# Patient Record
Sex: FEMALE | Race: WHITE | NOT HISPANIC OR LATINO | Employment: FULL TIME | URBAN - METROPOLITAN AREA
[De-identification: names, ages, dates, MRNs, and addresses within clinical notes are randomized per-mention and may not be internally consistent; named-entity substitution may affect disease eponyms.]

---

## 2024-02-22 ENCOUNTER — PATIENT OUTREACH (OUTPATIENT)
Dept: ONCOLOGY | Facility: CLINIC | Age: 64
End: 2024-02-22

## 2024-02-22 ENCOUNTER — TRANSCRIBE ORDERS (OUTPATIENT)
Dept: OTHER | Age: 64
End: 2024-02-22

## 2024-02-22 DIAGNOSIS — C23 GALLBLADDER CANCER (H): Primary | ICD-10-CM

## 2024-02-23 NOTE — PROGRESS NOTES
New Patient Oncology Nurse Navigator Note     Referring provider: self-referral    Referring Clinic/Organization: [facility] / [facility]      Referred to: Medical Oncology  Surgical Oncology -  Hepatobiliary / GI Cancers    Requested provider (if applicable): First available - did not specify     Referral Received: 02/22/24       Evaluation for : gallbladder malignancy     Clinical History (per Nurse review of records provided):      (See my bookmarks for pertinent records in Epic)      * 2/2/2024 lap enrique at [facility]    FINAL DIAGNOSIS     A. Gallbladder, cholecystectomy:    Invasive poorly differentiated adenocarcinoma of gallbladder       Tumor measures 2.4 cm in greatest dimension       Cystic duct margin and liver parenchyma bed margin negative for carcinoma       Lymphovascular invasion present       High grade dysplasia and low grade dysplasia present    Chronic cholecystitis with cholelithiasis    See synoptic report below        Selected slides from this case were also reviewed by Dr. ARMANDO Soria who agrees with the diagnosis.      The results of this case were communicated to Dr. Woody Munoz via EPIC email system on 02/06/2024.        Block for potential further studies: block A3          * 2/13/2024 CT CAP ()    IMPRESSION:   1.  A 2.5 cm fluid collection the gallbladder fossa. While this most likely represents a postsurgical seroma, the differential diagnosis includes abscess and biloma.   2.  No evidence of metastatic disease in the chest, abdomen or pelvis.      * 2/21/2024 Pt met w/ Dr Harris ([facility] [place]) for additional surgery planning:    Assessment and Plan: This is a pleasant 63-year-old female who was incidentally found to have poorly differentiated gallbladder adenocarcinoma following laparoscopic cholecystectomy for symptomatic cholelithiasis. Discussed pathology results with her and recommendations for segment IVB/V hepatic resection with portal lymphadenectomy  given pT2a with lymphovascular invasion. Fortunately her cystic duct margin was negative and thus does not require additional resection or intraoperative frozen section.    * 3/7/2024 surgery planned at [facility] [place] _______      Clinical Assessment / Barriers to Care (Per Nurse):    I spoke to pt, she requests 2nd opinion at N from our surg onc and med onc teams. Dr Hamilton has availability Mon 2/26/24 & Dr Mayers 3/4/24. Will offer appts to pt, or schedule per pt preference.        Records Location: [facility] Everywhere     Records Needed:     Outside imaging, path    Additional testing needed prior to consult:     N/A          Too Engel, RN, BSN, OCN  Oncology New Patient Nurse Navigator   [facility] [place] [facility]  [phone]

## 2024-02-23 NOTE — TELEPHONE ENCOUNTER
Appt Info          Pre Visit Info February 23, 2024 3:28 PM    Referring Provider/Location:  2nd Opinion  Dx and Code: Gallbladder cancer (H) [C23]  Appt Date:  2.26.24  Appt Type:  [facility]  Provider: Silviano Hamilton  Records:  [facility] are in Care Everywhere  Bx:  2.2.24 -  Regions/HP's Case: [id number]         Imaging:  [facility]     Records Requested  TJ   Clinic name Comments/Action Taken   [facility] February 23, 2024 3:45 PM    Called and requested all abdominal images over last 5 years  February 26, 2024  7:19 AM    Images received and resolved to PACS.   Regions/HP's February 23, 2024 3:45 PM    Faxed request for pathology slides  Trk: [id number]

## 2024-02-26 ENCOUNTER — PRE VISIT (OUTPATIENT)
Dept: SURGERY | Facility: CLINIC | Age: 64
End: 2024-02-26
Payer: COMMERCIAL

## 2024-02-26 ENCOUNTER — ONCOLOGY VISIT (OUTPATIENT)
Dept: SURGERY | Facility: CLINIC | Age: 64
End: 2024-02-26
Attending: SURGERY
Payer: COMMERCIAL

## 2024-02-26 VITALS
BODY MASS INDEX: 24.93 KG/M2 | RESPIRATION RATE: 16 BRPM | SYSTOLIC BLOOD PRESSURE: 113 MMHG | WEIGHT: 140.7 LBS | DIASTOLIC BLOOD PRESSURE: 77 MMHG | OXYGEN SATURATION: 96 % | HEART RATE: 89 BPM | HEIGHT: 63 IN | TEMPERATURE: 98.2 F

## 2024-02-26 DIAGNOSIS — C23 GALLBLADDER CANCER (H): Primary | ICD-10-CM

## 2024-02-26 PROCEDURE — 99205 OFFICE O/P NEW HI 60 MIN: CPT | Performed by: SURGERY

## 2024-02-26 PROCEDURE — 99213 OFFICE O/P EST LOW 20 MIN: CPT | Performed by: SURGERY

## 2024-02-26 RX ORDER — ESCITALOPRAM OXALATE 10 MG/1
1 TABLET ORAL
COMMUNITY
Start: 2024-02-23 | End: 2024-08-12

## 2024-02-26 RX ORDER — TRAZODONE HYDROCHLORIDE 50 MG/1
50 TABLET, FILM COATED ORAL
COMMUNITY
Start: 2024-02-23 | End: 2024-08-12

## 2024-02-26 ASSESSMENT — PAIN SCALES - GENERAL: PAINLEVEL: NO PAIN (0)

## 2024-02-26 NOTE — TELEPHONE ENCOUNTER
Appt Info          Pre Visit Info February 26, 2024 7:48 AM    Referring Provider/Location:  2nd opinion  Dx and Code: Gallbladder cancer (H) [C23]   Appt Date:  3.4.24  Appt Type:  [facility]  Provider: Talib  See pre-visit for 2.26.24- Images are in PACS and path has been requested  Records:  [facility] are in Care Everywhere  Bx:  2.2.24 -  Regions/HP's Case: [id number]        Imaging:  [facility]     Action Taken  Date/Description  TJ   Pathology Slides  February 28, 2024  12:46 PM  TJ  Slides [id number] from [facility] received and sent to 5th floor path lab for review.

## 2024-02-26 NOTE — PROGRESS NOTES
Department of Surgery  Division of Surgical Oncology    New Patient Consultation  Feb 26, 2024    Deann Durand is a 63 year old female who presents with incidental gallbladder cancer.  She was self-referred.    History of Present Illness     Sparkle recently underwent a lap enrique for a long-standing history of vague abdominal pain. Preop workup was consistent with symptomatic cholelithiasis. She underwent an uneventful lap enrique (per op notes) with Dr. Munoz on 2/2/24.    Pathology showed a poorly differentiated adenocarcinoma of the gallbladder, 2.4cm, cystic duct and liver margin both negative.     She is here seeking a second opinion - she recently saw Dr. Harris on 2/26 and he recommended a 4b/5 resection and portal LND, which is currently scheduled for early in March.    She reports recovering well from her lap enrique and has no additional pain.      Past Medical History:   Diagnosis Date    Depression     Gastroesophageal reflux disease without esophagitis     Malignant neoplasm of gallbladder (H)     Sleep disorder        Past Surgical History:   Procedure Laterality Date    CARPAL TUNNEL RELEASE RT/LT      CERVICAL SPINE SURGERY      No hardware in place    HYSTERECTOMY      LAPAROSCOPIC CHOLECYSTECTOMY      TUBAL LIGATION         Current Outpatient Medications   Medication Sig Dispense Refill    escitalopram (LEXAPRO) 10 MG tablet Take 1 tablet by mouth daily at 2 pm      omeprazole (PRILOSEC) 20 MG DR capsule Take 20 mg by mouth      traZODone (DESYREL) 50 MG tablet Take 50 mg by mouth at bedtime      MULTIPLE VITAMINS-MINERALS PO           Allergies   Allergen Reactions    Morphine Nausea and Vomiting        Social History:   reports that she has been smoking cigarettes. She has a 45 pack-year smoking history. She does not have any smokeless tobacco history on file. She reports that she does not currently use alcohol.    Family History:  Family History   Problem Relation Age of Onset    Throat cancer  "Father     Cancer Sister     Lung Cancer Sister     Cancer Brother     Throat cancer Maternal Grandmother     Colon Cancer Maternal Uncle          Physical Exam     /77 (BP Location: Right arm, Patient Position: Sitting, Cuff Size: Adult Regular)   Pulse 89   Temp 98.2  F (36.8  C) (Oral)   Resp 16   Ht 1.594 m (5' 2.75\")   Wt 63.8 kg (140 lb 11.2 oz)   SpO2 96%   BMI 25.12 kg/m       General: NAD, alert, oriented  HEENT: no scleral icterus, EOMI  Pulm: non-labored breathing, equal excursion bilaterally  CV: regular rate and rhythm  Abdomen: soft, non-tender, non-distended, incisions CDI   Extremities: warm, no edema  Skin: no apparent rashes or lesions  Neuro: no significant functional deficit, able to ambulate to and from exam table without assistance      Outside Records:  Reviewed extensively, see HPI    Workup has included a CT CAP with no evidence of metastasis (images and report reviewed, dated 2/13/24).      Assessment & Plan     Deann Durand is a 63 year old female with incidental gallbladder cancer.    The natural history of gallbladder cancer was discussed with the patient.    Incidental gallbladder cancer - gallbladder cancer diagnosed after a cholecystectomy done for reasons other than cancer - is the most common way that gallbladder cancer is diagnosed. The incidence of this is <0.5% of all cholecystectomies.    National standards in the management of gallbladder cancer recommend re-excision of the hepatic parenchymal margin to ensure clear margins; this requires resection of some or all of segments 4B and 5. The cystic duct margin should be re-excised if there is concern for residual cancer or dysplasia. Finally, a portal lymphadenectomy should be performed for prognostic/staging information and a possible therapeutic benefit. All of this information is regularly obtained prior to consideration of additional chemotherapy or other treatments in collaboration with a Medical " Oncologist.    In this case, I believe she is a good candidate for the above surgery - which I usually do in addition to a same-time diagnostic laparoscopy (owing to high rates of missed peritoneal disease at the index operation, depending on the size/stage of the cancer). She already has this scheduled with Dr. Harris and has decided to move forward with that date.    She did ask about seeing a medical oncologist here and she is already scheduled to see Dr. Mayers. I told both the patient and Dr. Mayers that they should push out their visit until after her surgical pathology from the second procedure is available (presumably mid to late March).      All questions were answered to their apparent satisfaction, and contact information was provided should additional questions or concerns arise.    Plan Summary:  -Patient to undergo portal LND and 4b/5 resection with Tacho Harris  -Patient has appointment with Dr. Mayers (may be pushed back - message sent to his team)  -Follow-up with me EDIS Hamilton MD MPHS      Today's visit was centered around a new cancer diagnosis in the setting of additional medical comorbidities. The risk of additional morbidity or mortality with or without further treatment is high. Total time spent was 60 minutes, including but not limited to patient-facing time, chart review, review of tests/studies as noted above, and care coordination.

## 2024-02-26 NOTE — NURSING NOTE
"Oncology Rooming Note    February 26, 2024 8:34 AM   Deann Durand is a 63 year old female who presents for:    Chief Complaint   Patient presents with    Oncology Clinic Visit     Gallbladder cancer     Initial Vitals: /77 (BP Location: Right arm, Patient Position: Sitting, Cuff Size: Adult Regular)   Pulse 89   Temp 98.2  F (36.8  C) (Oral)   Resp 16   Ht 1.594 m (5' 2.75\")   Wt 63.8 kg (140 lb 11.2 oz)   SpO2 96%   BMI 25.12 kg/m   Estimated body mass index is 25.12 kg/m  as calculated from the following:    Height as of this encounter: 1.594 m (5' 2.75\").    Weight as of this encounter: 63.8 kg (140 lb 11.2 oz). Body surface area is 1.68 meters squared.  No Pain (0) Comment: Data Unavailable   No LMP recorded.  Allergies reviewed: Yes  Medications reviewed: Yes    Medications: Medication refills not needed today.  Pharmacy name entered into [employer]: [facility] - [address]    Frailty Screening:   Is the patient here for a new oncology consult visit in cancer care? 1. Yes. Over the past month, have you experienced difficulty or required a caregiver to assist with:   1. Balance, walking or general mobility (including any falls)? NO  2. Completion of self-care tasks such as bathing, dressing, toileting, grooming/hygiene?  NO  3. Concentration or memory that affects your daily life?  YES       Clinical concerns: none      Rosaura Vega, EMT  2/26/2024              "

## 2024-02-26 NOTE — LETTER
2/26/2024         RE: Deann Durand  [address]  [facility] [address]        Dear Colleague,    Thank you for referring your patient, Deann Durand, to the [facility]. Please see a copy of my visit note below.      Department of Surgery  Division of Surgical Oncology    New Patient Consultation  Feb 26, 2024    Deann Durand is a 63 year old female who presents with incidental gallbladder cancer.  She was self-referred.    History of Present Illness    Sparkle recently underwent a lap enrique for a long-standing history of vague abdominal pain. Preop workup was consistent with symptomatic cholelithiasis. She underwent an uneventful lap enrique (per op notes) with Dr. Munoz on 2/2/24.    Pathology showed a poorly differentiated adenocarcinoma of the gallbladder, 2.4cm, cystic duct and liver margin both negative.     She is here seeking a second opinion - she recently saw Dr. Harris on 2/26 and he recommended a 4b/5 resection and portal LND, which is currently scheduled for early in March.    She reports recovering well from her lap enrique and has no additional pain.      Past Medical History:   Diagnosis Date    Depression     Gastroesophageal reflux disease without esophagitis     Malignant neoplasm of gallbladder (H)     Sleep disorder        Past Surgical History:   Procedure Laterality Date    CARPAL TUNNEL RELEASE RT/LT      CERVICAL SPINE SURGERY      No hardware in place    HYSTERECTOMY      LAPAROSCOPIC CHOLECYSTECTOMY      TUBAL LIGATION         Current Outpatient Medications   Medication Sig Dispense Refill    escitalopram (LEXAPRO) 10 MG tablet Take 1 tablet by mouth daily at 2 pm      omeprazole (PRILOSEC) 20 MG DR capsule Take 20 mg by mouth      traZODone (DESYREL) 50 MG tablet Take 50 mg by mouth at bedtime      MULTIPLE VITAMINS-MINERALS PO           Allergies   Allergen Reactions    Morphine Nausea and Vomiting        Social History:   reports that she has been smoking  "cigarettes. She has a 45 pack-year smoking history. She does not have any smokeless tobacco history on file. She reports that she does not currently use alcohol.    Family History:  Family History   Problem Relation Age of Onset    Throat cancer Father     Cancer Sister     Lung Cancer Sister     Cancer Brother     Throat cancer Maternal Grandmother     Colon Cancer Maternal Uncle          Physical Exam    /77 (BP Location: Right arm, Patient Position: Sitting, Cuff Size: Adult Regular)   Pulse 89   Temp 98.2  F (36.8  C) (Oral)   Resp 16   Ht 1.594 m (5' 2.75\")   Wt 63.8 kg (140 lb 11.2 oz)   SpO2 96%   BMI 25.12 kg/m       General: NAD, alert, oriented  HEENT: no scleral icterus, EOMI  Pulm: non-labored breathing, equal excursion bilaterally  CV: regular rate and rhythm  Abdomen: soft, non-tender, non-distended, incisions CDI   Extremities: warm, no edema  Skin: no apparent rashes or lesions  Neuro: no significant functional deficit, able to ambulate to and from exam table without assistance      Outside Records:  Reviewed extensively, see HPI    Workup has included a CT CAP with no evidence of metastasis (images and report reviewed, dated 2/13/24).      Assessment & Plan    Deann Durand is a 63 year old female with incidental gallbladder cancer.    The natural history of gallbladder cancer was discussed with the patient.    Incidental gallbladder cancer - gallbladder cancer diagnosed after a cholecystectomy done for reasons other than cancer - is the most common way that gallbladder cancer is diagnosed. The incidence of this is <0.5% of all cholecystectomies.    National standards in the management of gallbladder cancer recommend re-excision of the hepatic parenchymal margin to ensure clear margins; this requires resection of some or all of segments 4B and 5. The cystic duct margin should be re-excised if there is concern for residual cancer or dysplasia. Finally, a portal lymphadenectomy should be " performed for prognostic/staging information and a possible therapeutic benefit. All of this information is regularly obtained prior to consideration of additional chemotherapy or other treatments in collaboration with a Medical Oncologist.    In this case, I believe she is a good candidate for the above surgery - which I usually do in addition to a same-time diagnostic laparoscopy (owing to high rates of missed peritoneal disease at the index operation, depending on the size/stage of the cancer). She already has this scheduled with Dr. Harris and has decided to move forward with that date.    She did ask about seeing a medical oncologist here and she is already scheduled to see Dr. Mayers. I told both the patient and Dr. Mayers that they should push out their visit until after her surgical pathology from the second procedure is available (presumably mid to late March).      All questions were answered to their apparent satisfaction, and contact information was provided should additional questions or concerns arise.    Plan Summary:  -Patient to undergo portal LND and 4b/5 resection with Tacho Harris  -Patient has appointment with Dr. Mayers (may be pushed back - message sent to his team)  -Follow-up with me EDIS Hamilton MD MPHS      Today's visit was centered around a new cancer diagnosis in the setting of additional medical comorbidities. The risk of additional morbidity or mortality with or without further treatment is high. Total time spent was 60 minutes, including but not limited to patient-facing time, chart review, review of tests/studies as noted above, and care coordination.

## 2024-02-29 ENCOUNTER — LAB REQUISITION (OUTPATIENT)
Dept: LAB | Facility: CLINIC | Age: 64
End: 2024-02-29
Payer: COMMERCIAL

## 2024-02-29 PROCEDURE — 88321 CONSLTJ&REPRT SLD PREP ELSWR: CPT | Performed by: PATHOLOGY

## 2024-03-04 ENCOUNTER — PRE VISIT (OUTPATIENT)
Dept: ONCOLOGY | Facility: CLINIC | Age: 64
End: 2024-03-04
Payer: COMMERCIAL

## 2024-03-15 LAB
PATH REPORT.COMMENTS IMP SPEC: ABNORMAL
PATH REPORT.COMMENTS IMP SPEC: ABNORMAL
PATH REPORT.COMMENTS IMP SPEC: YES
PATH REPORT.FINAL DX SPEC: ABNORMAL
PATH REPORT.GROSS SPEC: ABNORMAL
PATH REPORT.MICROSCOPIC SPEC OTHER STN: ABNORMAL
PATH REPORT.RELEVANT HX SPEC: ABNORMAL
PATH REPORT.RELEVANT HX SPEC: ABNORMAL
PATH REPORT.SITE OF ORIGIN SPEC: ABNORMAL

## 2024-04-01 ENCOUNTER — PATIENT OUTREACH (OUTPATIENT)
Dept: ONCOLOGY | Facility: CLINIC | Age: 64
End: 2024-04-01
Payer: COMMERCIAL

## 2024-04-01 ENCOUNTER — ONCOLOGY VISIT (OUTPATIENT)
Dept: ONCOLOGY | Facility: CLINIC | Age: 64
End: 2024-04-01
Attending: STUDENT IN AN ORGANIZED HEALTH CARE EDUCATION/TRAINING PROGRAM
Payer: COMMERCIAL

## 2024-04-01 VITALS
WEIGHT: 135 LBS | HEART RATE: 99 BPM | BODY MASS INDEX: 23.92 KG/M2 | DIASTOLIC BLOOD PRESSURE: 82 MMHG | OXYGEN SATURATION: 97 % | TEMPERATURE: 97.4 F | RESPIRATION RATE: 16 BRPM | SYSTOLIC BLOOD PRESSURE: 134 MMHG | HEIGHT: 63 IN

## 2024-04-01 DIAGNOSIS — C23 MALIGNANT NEOPLASM OF GALLBLADDER (H): Primary | ICD-10-CM

## 2024-04-01 DIAGNOSIS — Z80.0 FAMILY HISTORY OF COLON CANCER: ICD-10-CM

## 2024-04-01 PROCEDURE — 99417 PROLNG OP E/M EACH 15 MIN: CPT | Mod: GC | Performed by: STUDENT IN AN ORGANIZED HEALTH CARE EDUCATION/TRAINING PROGRAM

## 2024-04-01 PROCEDURE — 99205 OFFICE O/P NEW HI 60 MIN: CPT | Mod: GC | Performed by: STUDENT IN AN ORGANIZED HEALTH CARE EDUCATION/TRAINING PROGRAM

## 2024-04-01 PROCEDURE — 99213 OFFICE O/P EST LOW 20 MIN: CPT | Performed by: STUDENT IN AN ORGANIZED HEALTH CARE EDUCATION/TRAINING PROGRAM

## 2024-04-01 ASSESSMENT — PAIN SCALES - GENERAL: PAINLEVEL: NO PAIN (0)

## 2024-04-01 NOTE — NURSING NOTE
"Oncology Rooming Note    April 1, 2024 1:24 PM   Deann Durand is a 63 year old female who presents for:    Chief Complaint   Patient presents with    Oncology Clinic Visit     Malignant neoplasm of gallbladder     Initial Vitals: /82 (BP Location: Right arm, Patient Position: Sitting, Cuff Size: Adult Small)   Pulse 99   Temp 97.4  F (36.3  C) (Oral)   Resp 16   Ht 1.588 m (5' 2.5\")   Wt 61.2 kg (135 lb)   SpO2 97%   BMI 24.30 kg/m   Estimated body mass index is 24.3 kg/m  as calculated from the following:    Height as of this encounter: 1.588 m (5' 2.5\").    Weight as of this encounter: 61.2 kg (135 lb). Body surface area is 1.64 meters squared.  No Pain (0) Comment: Data Unavailable   No LMP recorded.  Allergies reviewed: Yes  Medications reviewed: Yes    Medications: Medication refills not needed today.  Pharmacy name entered into [employer]: [facility] [address] [place] [address]    Frailty Screening:   Is the patient here for a new oncology consult visit in cancer care? 1. Yes. Over the past month, have you experienced difficulty or required a caregiver to assist with:   1. Balance, walking or general mobility (including any falls)? NO  2. Completion of self-care tasks such as bathing, dressing, toileting, grooming/hygiene?  NO  3. Concentration or memory that affects your daily life?  NO       Clinical concerns: none       Carmita Downing              "

## 2024-04-01 NOTE — LETTER
4/1/2024         RE: Deann Durand  [address]  [facility] [address]        Dear Colleague,    Thank you for referring your patient, Deann Durand, to the [facility]. Please see a copy of my visit note below.    [facility]  Medical Oncology Initial Patient Visit Note    Patient name: Deann Durand  YOB: 1960  Visit date: 4/1//2024    Oncologic History:  Diagnosis: Localized, resected (R0) gallbladder cancer (adenocarcinoma), negative (R0) margins, involvement of perimuscular adipose tissue on peritoneal side, poorly differentiated with lymphovascular invasion (LVI) present, 4/4 portal LN involved  Stage of cancer: [id number]     Prior treatment(s):   - 1) laparoscopic cholecystectomy on 2/2/24  - 2) Laparoscopic re-exploration with resection of liver (segment 4b/5) and portal lymphadenectomy on 3/7/24    Current treatment(s):  Adjuvant systemic therapy with oral capecitabine x 6 months (planned)    Treatment intent:  Curative     Care Team  Medical oncologist: Albert Mayers MD ([facility])  Surgical oncologist:  Tacho Harris MD ([facility]) and Silviano Hamilton MD ([facility])  Other members of care team: Dr. Munoz (General Surgery at [facility]) , Domi Darden (PCP) at [facility]   Primary caregiver at home/ contact:  Libra Beard (daughter) at [phone]    Reason for consultation/ patient visit:  Consideration of adjuvant therapy for resected gallbladder cancer     History of presenting illness:  Ms. Deann Durand is a 63 year old female with recent diagnosis of gallbladder cancer (now resected x 2) who presents to establish medical oncology care. Her oncologic history (as obtained from chart review, patient interview) is summarized below  -    12/23 and 1/24 - presented with chronic, vague abdominal pain and dyspepsia. Diagnosed with symptomatic cholelithiasis.   -2/2/24 : laparoscopic cholecystectomy with Dr. Munoz ([facility]) at  [facility]. Pathology showed poorly differentiated adenocarcinoma of gallbladder, 2.4cm in size, negative margins in cystic duct and liver side. pT2a with perimuscular and lymphovascular invasion noted.   -2/13/24: CT CAP - no evidence of metastatic disease.   -2/21/24 : [place]. Tacho [place] [facility] recommended repeat surgery with partial hepatic resection and portal lymphadenectomy.   -2/26/24 : Saw surgeon Dr. Silviano Hamilton at [facility], concurred with above.   -3/07/24 : underwent laparoscopic liver resection (of segment 4b/5) and portal lympadenectomy  -3/25/24 : visit with Med oncology to discuss adjuvant therapy     Interval history:  She is here today with her daughter Libra. Overall, she states she has recovered reasonably well from surgery and had a post-op check-up with Dr. Harris last week which went well. She reports only mild persistent pain at surgical site which is slowly resolving. She reports good appetite and po intake. Normal Bowel movements. She is walking around her house normally, and no issues with any ADL's. She is currently on short-term disability and planning to return to work on 4/27/24.     Past medical history:  GERD  Bilateral carpal tunnel syndrome    Past surgical history:  Laparoscopic Cholecystectomy (2/2/24)  Laparoscopic Liver resection (seg 4b/5) and portal lymphadenectomy (3/7/24)  Bladder suspension   cervical disc arthroplasty   hysterectomy   wisdom teeth extraction    Social history:   Currently lives independently in [place], [place]. Works at [employer] in [place], worked there for last 5 years. Jeet Smyth lives in [place], [place] (30min away from her) and involved in her care, also has health issues of her own. She also has two neices in [place] who check in on her. Loves to garden. 2 daughters, 4 grandkids,  Former smoker, 45 pack year history, currently smoking 1pack per day.       Family history:  EtOh use disorder in her birth father, birth mother,  "and brother;   Cancer -  father -throat cacner, uncle had colon cancer, maternal grandmother - throat cancer, and sister - lung cancer, another sister - stomach and colon cancer;   Dementia in her sister.     Allergies:   Allergies   Allergen Reactions    Morphine Nausea and Vomiting       Outpatient medications:     Current Outpatient Medications:     escitalopram (LEXAPRO) 10 MG tablet, Take 1 tablet by mouth daily at 2 pm, Disp: , Rfl:     MULTIPLE VITAMINS-MINERALS PO, , Disp: , Rfl:     omeprazole (PRILOSEC) 20 MG DR capsule, Take 20 mg by mouth, Disp: , Rfl:     traZODone (DESYREL) 50 MG tablet, Take 50 mg by mouth at bedtime, Disp: , Rfl:     Physical examination:  Vital signs: /82 (BP Location: Right arm, Patient Position: Sitting, Cuff Size: Adult Small)   Pulse 99   Temp 97.4  F (36.3  C) (Oral)   Resp 16   Ht 1.588 m (5' 2.5\")   Wt 61.2 kg (135 lb)   SpO2 97%   BMI 24.30 kg/m      ECOG performance status:  1  Vascular access:  none    GENERAL: moderately nourished, sitting in chair, no acute distress.   HEENT: No icterus, no pallor.   LUNGS: Normal work of breathing.   CARDIOVASCULAR: Regular rate and rhythm  ABDOMEN: Soft, nontender  EXTREMITIES: No edema, decreased muscle mass   NEUROLOGIC: Alert and oriented; mild resting tremors in hands. No gait instability.       Lab data:  I have personally reviewed the following lab data which are notable for    CBC shows mild, normocytic anemia with Hgb 11.1, metabolic panel without evidence of renal dysfunction (creat low normal), liver panel showing hypoalbuminemia (Alb 3.1), mild elevation in ALP (128), AST (58) and ALT (92) and normal bilirubin (0.8, direct 0.2)      Radiology data:  I have personally reviewed the images of / or the following radiology data which are notable for      CT CAP (2/15/24)  IMPRESSION:   1. A 2.5 cm fluid collection the gallbladder fossa. While this most likely represents a postsurgical seroma, the differential " diagnosis includes abscess and biloma.   2.  No evidence of metastatic disease in the chest, abdomen or pelvis.    Pathology and other data:  I have personally reviewed and interpreted the following data notable for      Gall bladder cholecystectomy specimen:(in-house N path consult report)   CASE FROM [place], [place] ([id number], OBTAINED 02/02/24):  Gallbladder, cholecystectomy:  - Adenocarcinoma, biliary type (2.4 cm)  - Tumor invades the perimuscular adipose tissue on the peritoneal side, without involvement of the serosa (pT2a)  - Lymphatic vascular invasion present  - Surgical margins are negative for malignancy or dysplasia    Liver Resection (IVb and V) and portal lymphadenectomy (3/7/24) specimen - from [facility] via Care Everywhere:  Specimen number:  [id number]   Final Diagnosis:   A. Liver, segment 4a nodule, wedge biopsy - Fibrous tissue and benign bile ducts, negative for   malignancy.     B. Lymphadenectomy, abdomen, dissection - Four lymph nodes positive for metastatic carcinoma   (4/4) and one fragment of benign adipose tissue.     C. Cystic duct stump, abdomen, biopsy - Cystic duct with surrounding fibroadipose and nerve   tissue. Negative for dysplasia and malignancy.     Liver, 4b 5, partial resection - Liver with prior procedure-related change. Negative for   malignancy.      Assessment and Plan:  Ms. Deann Durand is a 63 year old female with prior history of chronic smoking who presented initially with dyspepsia and abdominal pain, underwent cholecystectomy for what was presumed to be symptomatic cholelithiasis and was diagnosed with gallbladder cancer after it was incidentally discovered on histopathological analysis of cholecystectomy specimen. She underwent repeat surgery with resection of liver bed(seg 4b and 5) and portal lymphadenectomy with evidence of clear margins but involvement of portal LN. She is here today to discuss adjuvant therapy for her resected gallbladder  cancer.     We discussed her clinical course, extent of cancer and staging (see above). We discussed she has received standard of care surgical resection with removal of gallbladder as well as adjacent liver segments and portal LN's. We reviewed the high risk of recurrence with GB cancer in general and in particular in her case with risk factors such as high T-stage (tumor invading perimuscular connective tissue), lymphovascular invasion and involvement of >3 portal LN's.     We discussed the benefits and risks of adjuvant therapy with oral capecitabine for a total duration of 6 months. We reviewed the results of the BILCAP trial (Lancet, 2019) with results showing relapse-free survival of 25mos vs 18mos ([facility] observation alone), median OS 51months vs 36mos both of which is clinically meaningful and significant; NCCN guidelines recommend (level 1 evidence) consideration of adjuvant chemotherapy in this setting. We discussed common and uncommon side effects of this chemotherapy including but not limited to nausea, vomiting, mucositis, diarrhea, hand-foot syndrome.     We discussed the alternate adjuvant approach of combined chemotherapy with radiotherapy. There has been no direct head-to-head comparison of these two modalities in this setting. It has been established there is a significant overall survival benefit to adjuvant chemoRT and a paper examining a prediction model based on SEER-database data, appeared to suggest some superiority in median overall survival for chemoRT vs chemotherapy (Santosh et al, JCO 2011). [https://dmice.[facility].edu/nomograms/gastrointestinal/gallbladder.php.] Currently, ASCO recommends adjuvant chemoRT only in microscopically positive margins, NCCN recommends for patients with either margin-positive or node-positive disease (category 2B).     She was agreeable to starting oral chemotherapy.    We will plan to obtain baseline CT CAP prior to chemotherapy and repeat interim restaging scan at 3  months and at 6 months. We will also obtain tumor-specific genomic MRD assay at baseline, followed by every 3 months; We will obtain baseline serum tumor markers - CA 19-9 and CEA.       Plan:  - obtain baseline imaging with CT CAP with and without contrast  - obtain somatic NGS  (CARIS)   - Start adjuvant therapy with oral capecitabine at dose of 1500mg twice daily (q12h) on d1-14 of 21-day cycle, planned total 8 cycles.   - repeat surveillance CT CAP maybe q3 months  - refer to Genetic Counselor for germline genetic testing given strong family history of cancers     The patient and family asked numerous excellent questions which we answered to the best of our abilities. At the completion of our consultation, the patient and accompanying caregivers had an excellent understanding of the plan. They have our contact information for any further questions or concerns and of course, as always, we are available in the interim.       The patient was seen with and the above assessment and plan was discussed with staff physician Dr. Albert Mayers MD who agreed with the same.    Emeka Cooney   PGY-6 Fellow, Hematology,Oncology and BMT  [facility]     ----------------------    Physician Attestation    IAlbert, saw and evaluated Deann Durand in-person and agree with the resident/ fellow documentation by Dr. Cooney.    I have reviewed and discussed with them the history, physical exam, and plan.  I personally reviewed the vital signs, interval events, medications, labs and imaging.      I personally performed the substantive portion of the medical decision making for this visit - please see the full documentation for full details.      Key management decisions made by me and carried out under my direction:   Marie 64 yo woman with smoking/ emphysema with initially incidental gallbladder cancer, found to have pT2N2 disease with several high risk features (multiple LN +, PNI, LVI, poorly  differentiated) on final path.    She is recovering well from surgery.    We met today with daughter and discussed role of adjuvant therapy.    We discussed BILCAP trial and role of capecitabine.  We discussed role of radiation, and how it could add benefit, but also visits.    They decided to pursue adjuvant capecitabine.    We will send Nicky NGS on surgical sample from 3/7/24.    We will refer for germline testing given strong family history of cancer.    Repeat labs and CT CAP in next 1-2 weeks.  Start cape 1000 mg bid (D1-14, q21 days) with REJI visit around C1D1.  Starting lower dose given her low weight.  Next cycles (virtual vs. in person, where to do labs prior etc) TBD at that first REJI visit. Her wifi may be weak in house, daughter lives in [place] etc.    Counseled re: smoking but no current plans to quit.         I spent a total of 90 minutes on the date of service including preparation time (e.g., review of records and interpretation of tests), visit time with the patient and care partners, requesting interventions, communicating with other health care professionals, and documentation.      Date of Service (when I saw the patient): 04/01/24    Albert Mayers M.D.   of Medicine  Division of Hematology, Oncology and Transplantation  [facility]

## 2024-04-01 NOTE — PROGRESS NOTES
[facility]: Cancer Care Short Note                                                                                          Met with Sparkle and her daughter Libra to introduce myself as Dr. Mayers's RNCC.  Consent to communicate form completed.  Discussed oral chemotherapy team and the triage team.  Plan to start oral capecitabine.      Lucille Ravi RN, BSN  RN Care Coordinator   [facility] Cancer [facility]

## 2024-04-02 ENCOUNTER — PATIENT OUTREACH (OUTPATIENT)
Dept: ONCOLOGY | Facility: CLINIC | Age: 64
End: 2024-04-02
Payer: COMMERCIAL

## 2024-04-02 ENCOUNTER — TELEPHONE (OUTPATIENT)
Dept: ONCOLOGY | Facility: CLINIC | Age: 64
End: 2024-04-02
Payer: COMMERCIAL

## 2024-04-02 DIAGNOSIS — C23 MALIGNANT NEOPLASM OF GALLBLADDER (H): Primary | ICD-10-CM

## 2024-04-02 DIAGNOSIS — Z79.899 OTHER LONG TERM (CURRENT) DRUG THERAPY: ICD-10-CM

## 2024-04-02 NOTE — PROGRESS NOTES
Writer received Cancer Risk Management Program referral, referred for:    cholangiocarcinoma with strong family history of cancer      Reviewed for appropriate plan, and sent to New Patient Scheduling for completion.

## 2024-04-02 NOTE — TELEPHONE ENCOUNTER
PA Initiation    Medication: CAPECITABINE 500 MG PO TABS  Insurance Company: [employer] - Phone [phone] Fax [phone]  Pharmacy Filling the Rx:    Filling Pharmacy Phone:    Filling Pharmacy Fax:    Start Date: 4/2/2024

## 2024-04-02 NOTE — PROGRESS NOTES
[facility]: Cancer Care Short Note                                                                                          Caris testing requisition submitted on pathology specimen [id number] as requested by Dr Mayers.      Lucille Ravi RN, BSN  RN Care Coordinator   [facility] Cancer [facility]

## 2024-04-02 NOTE — ORAL ONC MGMT
Prior Authorization Approval    Medication: CAPECITABINE 500 MG PO TABS  Authorization Effective Date: 3/3/2024  Authorization Expiration Date: 4/2/2025  Approved Dose/Quantity: 56/21  Reference #: [id number]   Insurance Company: [employer] - Phone [phone] Fax [phone]  Expected CoPay: $    CoPay Card Available:      Financial Assistance Needed:   Which Pharmacy is filling the prescription: [facility] [address] [facility]  Pharmacy Notified: Yes  Patient Notified: Yes    Approval letter not received at this time,

## 2024-04-03 ENCOUNTER — TELEPHONE (OUTPATIENT)
Dept: ONCOLOGY | Facility: CLINIC | Age: 64
End: 2024-04-03
Payer: COMMERCIAL

## 2024-04-03 DIAGNOSIS — C23 MALIGNANT NEOPLASM OF GALLBLADDER (H): Primary | ICD-10-CM

## 2024-04-03 RX ORDER — FAMOTIDINE 20 MG/1
20 TABLET, FILM COATED ORAL 2 TIMES DAILY
Qty: 60 TABLET | Refills: 11 | Status: CANCELLED | OUTPATIENT
Start: 2024-04-03

## 2024-04-03 RX ORDER — FAMOTIDINE 20 MG/1
20 TABLET, FILM COATED ORAL 2 TIMES DAILY
Qty: 60 TABLET | Refills: 3 | Status: SHIPPED | OUTPATIENT
Start: 2024-04-03

## 2024-04-03 RX ORDER — CAPECITABINE 500 MG/1
1000 TABLET, FILM COATED ORAL 2 TIMES DAILY
Qty: 56 TABLET | Refills: 0 | OUTPATIENT
Start: 2024-04-15 | End: 2024-04-03

## 2024-04-03 RX ORDER — PROCHLORPERAZINE MALEATE 10 MG
10 TABLET ORAL EVERY 6 HOURS PRN
Qty: 30 TABLET | Refills: 2 | Status: SHIPPED | OUTPATIENT
Start: 2024-04-14 | End: 2024-08-12

## 2024-04-03 RX ORDER — CAPECITABINE 500 MG/1
1000 TABLET, FILM COATED ORAL 2 TIMES DAILY
Status: SHIPPED
Start: 2024-04-15 | End: 2024-04-03

## 2024-04-03 RX ORDER — CAPECITABINE 500 MG/1
1000 TABLET, FILM COATED ORAL 2 TIMES DAILY
Qty: 56 TABLET | Refills: 0 | Status: SHIPPED | OUTPATIENT
Start: 2024-04-15 | End: 2024-05-03

## 2024-04-03 RX ORDER — ONDANSETRON 4 MG/1
4 TABLET, FILM COATED ORAL EVERY 6 HOURS PRN
COMMUNITY
End: 2024-08-12

## 2024-04-03 NOTE — ADDENDUM NOTE
Addended by: JULIUS HERRERA on: 4/3/2024 11:59 AM     Modules accepted: Orders    
TOKEN:'[id number]',FREE:[LAST:[Santos],FIRST:[Tadeo],PHONE:[(   )    -],FAX:[(   )    -]]

## 2024-04-03 NOTE — ORAL ONC MGMT
"Oral Chemotherapy Monitoring Program    Lab Monitoring Plan: CBC & CMP every 3 weeks  Labs drawn outside of [place]: TBD - will be getting labs at [facility] with cycle 1 and will discuss plans for future cycles  Subjective/Objective:  Deann Durand is a pleasant 63 year old female contacted by phone for an initial visit for oral chemotherapy education of capecitabine for gallbladder cancer. Reviewed Sparkle's medication list with her and updated.        4/2/2024    11:00 AM 4/3/2024    10:00 AM   ORAL CHEMOTHERAPY   Assessment Type Initial Work up New Teach   Diagnosis Code Gallbladder Cancer Gallbladder Cancer   Providers Dr. Talib Mayers   Clinic Name/Location [facility]   Drug Name Xeloda (capecitabine) Xeloda (capecitabine)   Dose 1,000 mg 1,000 mg   Current Schedule BID BID   Cycle Details 2 weeks on, 1 week off 2 weeks on, 1 week off     Last PHQ-2 Score on record:        No data to display              Vitals:  BP:   BP Readings from Last 1 Encounters:   04/01/24 134/82     Wt Readings from Last 1 Encounters:   04/01/24 61.2 kg (135 lb)     Estimated body surface area is 1.64 meters squared as calculated from the following:    Height as of 4/1/24: 1.588 m (5' 2.5\").    Weight as of 4/1/24: 61.2 kg (135 lb).    Labs: Reviewed labs in Care Everywhere. Baseline labs will be drawn 4/11 in our system.    Assessment:  Patient is appropriate to start therapy pending upcoming labs on 4/11 and visit with Kori Guaman 4/16.    Plan:  Basic chemotherapy teaching was reviewed with the patient including indication, start date of therapy, dose, administration, adverse effects, missed doses, food and drug interactions, monitoring, side effect management, office contact information, and safe handling. Written materials were provided and all questions answered.    Discussed drug interaction between omeprazole an capecitabine with Sparkle. Sparkle reports that she's been taking omeprazole daily for a couple of years for acid " reflux. She is willing to try an alternative. Recommended trial of famotidine. Also recommended that, when Sparkle starts taking famotdine, she continues omeprazole every other day for 1-2 weeks before stopping to help with potential rebound reflux. Also noted that, if Sparkle finds that her acid is not controlled on famotidine, Dr. Mayers is okay with her staying on omeprazole. Sparkle understood this plan and will give famotidine a try. RX routed to Dr. Mayers for signature.    Sparkle also mentioned that she has ondansetron on hand for nausea and vomiting. Added this to her medication list and noted that she may trial this for nausea. Also sent prochlorperazine RX to her home pharmacy ([facility]) in case she should need it.    Follow-Up:  4/11 labs, scan  4/16 visit with Kori Storey, PharmD, [facility]  Oral Chemotherapy Monitoring Program  [facility]  [phone]

## 2024-04-07 ENCOUNTER — TRANSFERRED RECORDS (OUTPATIENT)
Dept: HEALTH INFORMATION MANAGEMENT | Facility: CLINIC | Age: 64
End: 2024-04-07
Payer: COMMERCIAL

## 2024-04-11 ENCOUNTER — LAB (OUTPATIENT)
Dept: LAB | Facility: HOSPITAL | Age: 64
End: 2024-04-11
Attending: STUDENT IN AN ORGANIZED HEALTH CARE EDUCATION/TRAINING PROGRAM
Payer: COMMERCIAL

## 2024-04-11 ENCOUNTER — HOSPITAL ENCOUNTER (OUTPATIENT)
Dept: CT IMAGING | Facility: HOSPITAL | Age: 64
Discharge: HOME | End: 2024-04-11
Attending: STUDENT IN AN ORGANIZED HEALTH CARE EDUCATION/TRAINING PROGRAM
Payer: COMMERCIAL

## 2024-04-11 DIAGNOSIS — C23 MALIGNANT NEOPLASM OF GALLBLADDER (H): ICD-10-CM

## 2024-04-11 LAB
ALBUMIN SERPL BCG-MCNC: 4.3 G/DL (ref 3.5–5.2)
ALP SERPL-CCNC: 173 U/L (ref 40–150)
ALT SERPL W P-5'-P-CCNC: 18 U/L (ref 0–50)
ANION GAP SERPL CALCULATED.3IONS-SCNC: 13 MMOL/L (ref 7–15)
AST SERPL W P-5'-P-CCNC: 26 U/L (ref 0–45)
BASOPHILS # BLD AUTO: 0.1 10E3/UL (ref 0–0.2)
BASOPHILS NFR BLD AUTO: 1 %
BILIRUB SERPL-MCNC: 0.4 MG/DL
BUN SERPL-MCNC: 13.3 MG/DL (ref 8–23)
CALCIUM SERPL-MCNC: 9.6 MG/DL (ref 8.8–10.2)
CHLORIDE SERPL-SCNC: 102 MMOL/L (ref 98–107)
CREAT SERPL-MCNC: 1.17 MG/DL (ref 0.51–0.95)
DEPRECATED HCO3 PLAS-SCNC: 25 MMOL/L (ref 22–29)
EGFRCR SERPLBLD CKD-EPI 2021: 52 ML/MIN/1.73M2
EOSINOPHIL # BLD AUTO: 0.5 10E3/UL (ref 0–0.7)
EOSINOPHIL NFR BLD AUTO: 7 %
ERYTHROCYTE [DISTWIDTH] IN BLOOD BY AUTOMATED COUNT: 12.2 % (ref 10–15)
GLUCOSE SERPL-MCNC: 94 MG/DL (ref 70–99)
HCT VFR BLD AUTO: 44.1 % (ref 35–47)
HGB BLD-MCNC: 14.4 G/DL (ref 11.7–15.7)
IMM GRANULOCYTES # BLD: 0 10E3/UL
IMM GRANULOCYTES NFR BLD: 1 %
LYMPHOCYTES # BLD AUTO: 2.7 10E3/UL (ref 0.8–5.3)
LYMPHOCYTES NFR BLD AUTO: 34 %
MCH RBC QN AUTO: 29.9 PG (ref 26.5–33)
MCHC RBC AUTO-ENTMCNC: 32.7 G/DL (ref 31.5–36.5)
MCV RBC AUTO: 92 FL (ref 78–100)
MONOCYTES # BLD AUTO: 0.6 10E3/UL (ref 0–1.3)
MONOCYTES NFR BLD AUTO: 8 %
NEUTROPHILS # BLD AUTO: 3.9 10E3/UL (ref 1.6–8.3)
NEUTROPHILS NFR BLD AUTO: 49 %
NRBC # BLD AUTO: 0 10E3/UL
NRBC BLD AUTO-RTO: 0 /100
PLATELET # BLD AUTO: 236 10E3/UL (ref 150–450)
POTASSIUM SERPL-SCNC: 3.6 MMOL/L (ref 3.4–5.3)
PROT SERPL-MCNC: 7.5 G/DL (ref 6.4–8.3)
RBC # BLD AUTO: 4.82 10E6/UL (ref 3.8–5.2)
SODIUM SERPL-SCNC: 140 MMOL/L (ref 135–145)
WBC # BLD AUTO: 7.9 10E3/UL (ref 4–11)

## 2024-04-11 PROCEDURE — 86301 IMMUNOASSAY TUMOR CA 19-9: CPT

## 2024-04-11 PROCEDURE — 250N000011 HC RX IP 250 OP 636: Performed by: STUDENT IN AN ORGANIZED HEALTH CARE EDUCATION/TRAINING PROGRAM

## 2024-04-11 PROCEDURE — 71260 CT THORAX DX C+: CPT

## 2024-04-11 PROCEDURE — 36415 COLL VENOUS BLD VENIPUNCTURE: CPT

## 2024-04-11 PROCEDURE — 85048 AUTOMATED LEUKOCYTE COUNT: CPT

## 2024-04-11 PROCEDURE — 80053 COMPREHEN METABOLIC PANEL: CPT

## 2024-04-11 RX ORDER — IOPAMIDOL 755 MG/ML
70 INJECTION, SOLUTION INTRAVASCULAR ONCE
Status: COMPLETED | OUTPATIENT
Start: 2024-04-11 | End: 2024-04-11

## 2024-04-11 RX ADMIN — IOPAMIDOL 70 ML: 755 INJECTION, SOLUTION INTRAVENOUS at 14:49

## 2024-04-13 LAB — CANCER AG19-9 SERPL IA-ACNC: 84 U/ML

## 2024-04-16 ENCOUNTER — ONCOLOGY VISIT (OUTPATIENT)
Dept: ONCOLOGY | Facility: CLINIC | Age: 64
End: 2024-04-16
Attending: STUDENT IN AN ORGANIZED HEALTH CARE EDUCATION/TRAINING PROGRAM
Payer: COMMERCIAL

## 2024-04-16 VITALS
RESPIRATION RATE: 16 BRPM | HEART RATE: 88 BPM | BODY MASS INDEX: 24.46 KG/M2 | OXYGEN SATURATION: 94 % | WEIGHT: 135.9 LBS | TEMPERATURE: 97.8 F | SYSTOLIC BLOOD PRESSURE: 125 MMHG | DIASTOLIC BLOOD PRESSURE: 75 MMHG

## 2024-04-16 DIAGNOSIS — C23 MALIGNANT NEOPLASM OF GALLBLADDER (H): Primary | ICD-10-CM

## 2024-04-16 PROCEDURE — G2211 COMPLEX E/M VISIT ADD ON: HCPCS

## 2024-04-16 PROCEDURE — 99215 OFFICE O/P EST HI 40 MIN: CPT

## 2024-04-16 PROCEDURE — 99213 OFFICE O/P EST LOW 20 MIN: CPT

## 2024-04-16 PROCEDURE — 99417 PROLNG OP E/M EACH 15 MIN: CPT

## 2024-04-16 RX ORDER — CYCLOBENZAPRINE HCL 5 MG
5 TABLET ORAL 2 TIMES DAILY PRN
COMMUNITY
Start: 2024-03-11 | End: 2024-08-12

## 2024-04-16 RX ORDER — ACETAMINOPHEN 325 MG/1
3 TABLET ORAL 3 TIMES DAILY
COMMUNITY
Start: 2024-03-11 | End: 2024-08-12

## 2024-04-16 RX ORDER — ALBUTEROL SULFATE 90 UG/1
1-2 AEROSOL, METERED RESPIRATORY (INHALATION) EVERY 4 HOURS PRN
COMMUNITY
End: 2024-08-12

## 2024-04-16 RX ORDER — LIDOCAINE 50 MG/G
PATCH TOPICAL EVERY 24 HOURS
COMMUNITY
Start: 2024-03-12 | End: 2024-08-12

## 2024-04-16 RX ORDER — POLYETHYLENE GLYCOL 3350 17 G/17G
17 POWDER, FOR SOLUTION ORAL DAILY
COMMUNITY
Start: 2024-03-12 | End: 2024-08-12

## 2024-04-16 RX ORDER — OXYCODONE HYDROCHLORIDE 5 MG/1
5 TABLET ORAL EVERY 6 HOURS PRN
COMMUNITY
Start: 2024-03-11 | End: 2024-08-12

## 2024-04-16 RX ORDER — SENNOSIDES A AND B 8.6 MG/1
8.6 TABLET, FILM COATED ORAL 2 TIMES DAILY PRN
COMMUNITY
Start: 2024-03-11 | End: 2024-08-12

## 2024-04-16 ASSESSMENT — PAIN SCALES - GENERAL: PAINLEVEL: NO PAIN (0)

## 2024-04-16 NOTE — NURSING NOTE
"Oncology Rooming Note    April 16, 2024 7:44 AM   Deann Durand is a 63 year old female who presents for:    Chief Complaint   Patient presents with    Oncology Clinic Visit     Cancer of the Gallbladder     Initial Vitals: /75 (BP Location: Right arm, Patient Position: Sitting, Cuff Size: Adult Regular)   Pulse 88   Temp 97.8  F (36.6  C) (Tympanic)   Resp 16   Wt 61.6 kg (135 lb 14.4 oz)   SpO2 94%   BMI 24.46 kg/m   Estimated body mass index is 24.46 kg/m  as calculated from the following:    Height as of 4/1/24: 1.588 m (5' 2.5\").    Weight as of this encounter: 61.6 kg (135 lb 14.4 oz). Body surface area is 1.65 meters squared.  No Pain (0) Comment: Data Unavailable   No LMP recorded. (Menstrual status: UNKNOWN).  Allergies reviewed: Yes  Medications reviewed: Yes    Medications: Medication refills not needed today.  Pharmacy name entered into [employer]:    [facility] [place] [place] - [address], [place] - [address] [place] [address]    Frailty Screening:   Is the patient here for a new oncology consult visit in cancer care? 2. No      Clinical concerns: None       Minda Coley LPN  4/16/2024                "

## 2024-04-16 NOTE — Clinical Note
4/16/2024         RE: Deann Durand  [address]  [facility] [address]        Dear Colleague,    Thank you for referring your patient, Deann Durand, to the [facility]. Please see a copy of my visit note below.    [facility]  Medical Oncology Initial Patient Visit Note    Patient name: Deann Durand  YOB: 1960  Visit date: 4/1//2024    Oncologic History:  Diagnosis: Localized, resected (R0) gallbladder cancer (adenocarcinoma), negative (R0) margins, involvement of perimuscular adipose tissue on peritoneal side, poorly differentiated with lymphovascular invasion (LVI) present, 4/4 portal LN involved  Stage of cancer: [id number]     Prior treatment(s):   - 1) laparoscopic cholecystectomy on 2/2/24  - 2) Laparoscopic re-exploration with resection of liver (segment 4b/5) and portal lymphadenectomy on 3/7/24    Current treatment(s):  Adjuvant systemic therapy with oral capecitabine x 6 months (planned)    Treatment intent:  Curative     Care Team  Medical oncologist: Albert Mayers MD ([facility])  Surgical oncologist:  Tacho Harris MD ([facility]) and Silviano Hamilton MD ([facility])  Other members of care team: Dr. Munoz (General Surgery at [facility]) , Domi Darden (PCP) at [facility]   Primary caregiver at home/ contact:  Libra Beard (daughter) at [phone]    Reason for consultation/ patient visit:  Consideration of adjuvant therapy for resected gallbladder cancer     History of presenting illness:  Ms. Deann Durand is a 63 year old female with recent diagnosis of gallbladder cancer (now resected x 2) who presents to establish medical oncology care. Her oncologic history (as obtained from chart review, patient interview) is summarized below  -    12/23 and 1/24 - presented with chronic, vague abdominal pain and dyspepsia. Diagnosed with symptomatic cholelithiasis.   -2/2/24 : laparoscopic cholecystectomy with Dr. Munoz ([facility]) at  [facility]. Pathology showed poorly differentiated adenocarcinoma of gallbladder, 2.4cm in size, negative margins in cystic duct and liver side. pT2a with perimuscular and lymphovascular invasion noted.   -2/13/24: CT CAP - no evidence of metastatic disease.   -2/21/24 : [place]. Tacho [place] [facility] recommended repeat surgery with partial hepatic resection and portal lymphadenectomy.   -2/26/24 : Saw surgeon Dr. Silviano Hamilton at [facility], concurred with above.   -3/07/24 : underwent laparoscopic liver resection (of segment 4b/5) and portal lympadenectomy  -3/25/24 : visit with Med oncology to discuss adjuvant therapy   ***   4/11 CT ***     Interval history:      Pills coming on Thursday, plans to start on Sunday.          ***   She is here today with her daughter Libra. Overall, she states she has recovered reasonably well from surgery and had a post-op check-up with Dr. Harris last week which went well. She reports only mild persistent pain at surgical site which is slowly resolving. She reports good appetite and po intake. Normal Bowel movements. She is walking around her house normally, and no issues with any ADL's. She is currently on short-term disability and planning to return to work on 4/27/24.     Past medical history:  GERD  Bilateral carpal tunnel syndrome    Past surgical history:  Laparoscopic Cholecystectomy (2/2/24)  Laparoscopic Liver resection (seg 4b/5) and portal lymphadenectomy (3/7/24)  Bladder suspension   cervical disc arthroplasty   hysterectomy   wisdom teeth extraction    Social history:   Currently lives independently in [place], [place]. Works at [employer] in [place], worked there for last 5 years. Jeet Smyth lives in [place], [place] (30min away from her) and involved in her care, also has health issues of her own. She also has two neices in [place] who check in on her. Loves to garden. 2 daughters, 4 grandkids,  Former smoker, 45 pack year history, currently smoking  1pack per day.       Family history:  EtOh use disorder in her birth father, birth mother, and brother;   Cancer -  father -throat cacner, uncle had colon cancer, maternal grandmother - throat cancer, and sister - lung cancer, another sister - stomach and colon cancer;   Dementia in her sister.     Allergies:   Allergies   Allergen Reactions    Morphine Nausea and Vomiting       Outpatient medications:     Current Outpatient Medications:     capecitabine (XELODA) 500 MG tablet, Take 2 tablets (1,000 mg) by mouth 2 times daily Days 1 through 14, then off for 7 days.Take with water within 30 minutes after a meal., Disp: 56 tablet, Rfl: 0    escitalopram (LEXAPRO) 10 MG tablet, Take 1 tablet by mouth daily at 2 pm, Disp: , Rfl:     famotidine (PEPCID) 20 MG tablet, Take 1 tablet (20 mg) by mouth 2 times daily, Disp: 60 tablet, Rfl: 3    MULTIPLE VITAMINS-MINERALS PO, Take 1 tablet by mouth daily, Disp: , Rfl:     omeprazole (PRILOSEC) 20 MG DR capsule, Take 20 mg by mouth daily, Disp: , Rfl:     ondansetron (ZOFRAN) 4 MG tablet, Take 4 mg by mouth every 6 hours as needed for nausea or vomiting, Disp: , Rfl:     prochlorperazine (COMPAZINE) 10 MG tablet, Take 1 tablet (10 mg) by mouth every 6 hours as needed for nausea or vomiting, Disp: 30 tablet, Rfl: 2    traZODone (DESYREL) 50 MG tablet, Take 50 mg by mouth nightly as needed for sleep, Disp: , Rfl:         Physical examination: ***   Vital signs: There were no vitals taken for this visit.    ECOG performance status:  1  Vascular access:  none    GENERAL: moderately nourished, sitting in chair, no acute distress.   HEENT: No icterus, no pallor.   LUNGS: Normal work of breathing.   CARDIOVASCULAR: Regular rate and rhythm  ABDOMEN: Soft, nontender  EXTREMITIES: No edema, decreased muscle mass   NEUROLOGIC: Alert and oriented; mild resting tremors in hands. No gait instability.       Lab data:    Most Recent 3 CBC's:  Recent Labs   Lab Test 04/11/24  1407   WBC 7.9    HGB 14.4   MCV 92      ANEUTAUTO 3.9     Most Recent 3 BMP's:  Recent Labs   Lab Test 04/11/24  1407      POTASSIUM 3.6   CHLORIDE 102   CO2 25   BUN 13.3   CR 1.17*   ANIONGAP 13   RAMEZ 9.6   GLC 94   PROTTOTAL 7.5   ALBUMIN 4.3    Most Recent 3 LFT's:  Recent Labs   Lab Test 04/11/24  1407   AST 26   ALT 18   ALKPHOS 173*   BILITOTAL 0.4    Most Recent 2 TSH and T4:No lab results found.  I reviewed the above labs today.      Radiology data: ***   I have personally reviewed the images of / or the following radiology data which are notable for      CT CAP (2/15/24)  IMPRESSION:   1. A 2.5 cm fluid collection the gallbladder fossa. While this most likely represents a postsurgical seroma, the differential diagnosis includes abscess and biloma.   2.  No evidence of metastatic disease in the chest, abdomen or pelvis.    Pathology and other data:  I have personally reviewed and interpreted the following data notable for      Gall bladder cholecystectomy specimen:(in-house [facility] path consult report)   CASE FROM [place], [place] ([id number], OBTAINED 02/02/24):  Gallbladder, cholecystectomy:  - Adenocarcinoma, biliary type (2.4 cm)  - Tumor invades the perimuscular adipose tissue on the peritoneal side, without involvement of the serosa (pT2a)  - Lymphatic vascular invasion present  - Surgical margins are negative for malignancy or dysplasia    Liver Resection (IVb and V) and portal lymphadenectomy (3/7/24) specimen - from [facility] via Care Everywhere:  Specimen number:  [id number]   Final Diagnosis:   A. Liver, segment 4a nodule, wedge biopsy - Fibrous tissue and benign bile ducts, negative for   malignancy.     B. Lymphadenectomy, abdomen, dissection - Four lymph nodes positive for metastatic carcinoma   (4/4) and one fragment of benign adipose tissue.     C. Cystic duct stump, abdomen, biopsy - Cystic duct with surrounding fibroadipose and nerve   tissue. Negative for dysplasia and malignancy.      Liver, 4b 5, partial resection - Liver with prior procedure-related change. Negative for   malignancy.      Assessment and Plan:  Ms. Deann Durand is a 63 year old female with prior history of chronic smoking who presented initially with dyspepsia and abdominal pain, underwent cholecystectomy for what was presumed to be symptomatic cholelithiasis and was diagnosed with gallbladder cancer after it was incidentally discovered on histopathological analysis of cholecystectomy specimen. She underwent repeat surgery with resection of liver bed(seg 4b and 5) and portal lymphadenectomy with evidence of clear margins but involvement of portal LN.   ***     We discussed her clinical course, extent of cancer and staging (see above). We discussed she has received standard of care surgical resection with removal of gallbladder as well as adjacent liver segments and portal LN's. We reviewed the high risk of recurrence with GB cancer in general and in particular in her case with risk factors such as high T-stage (tumor invading perimuscular connective tissue), lymphovascular invasion and involvement of >3 portal LN's.     We discussed the benefits and risks of adjuvant therapy with oral capecitabine for a total duration of 6 months. We reviewed the results of the BILCAP trial (Lancet, 2019) with results showing relapse-free survival of 25mos vs 18mos ([facility] observation alone), median OS 51months vs 36mos both of which is clinically meaningful and significant; NCCN guidelines recommend (level 1 evidence) consideration of adjuvant chemotherapy in this setting. We discussed common and uncommon side effects of this chemotherapy including but not limited to nausea, vomiting, mucositis, diarrhea, hand-foot syndrome.     We discussed the alternate adjuvant approach of combined chemotherapy with radiotherapy. There has been no direct head-to-head comparison of these two modalities in this setting. It has been established there is a  significant overall survival benefit to adjuvant chemoRT and a paper examining a prediction model based on SEER-database data, appeared to suggest some superiority in median overall survival for chemoRT vs chemotherapy (Santosh et al, JCO 2011). [https://dmice.[facility].edu/nomograms/gastrointestinal/gallbladder.php.] Currently, ASCO recommends adjuvant chemoRT only in microscopically positive margins, NCCN recommends for patients with either margin-positive or node-positive disease (category 2B).     She was agreeable to starting oral chemotherapy.    We will plan to obtain baseline CT CAP prior to chemotherapy and repeat interim restaging scan at 3 months and at 6 months. We will also obtain tumor-specific genomic MRD assay at baseline, followed by every 3 months; We will obtain baseline serum tumor markers - CA 19-9 and CEA.       Plan:  - obtain baseline imaging with CT CAP with and without contrast  - obtain somatic NGS  ([facility])   - Start adjuvant therapy with oral capecitabine at dose of 1500mg twice daily (q12h) on d1-14 of 21-day cycle, planned total 8 cycles.   - repeat surveillance CT CAP maybe q3 months  - refer to Genetic Counselor for germline genetic testing given strong family history of cancers     The patient and family asked numerous excellent questions which we answered to the best of our abilities. At the completion of our consultation, the patient and accompanying caregivers had an excellent understanding of the plan. They have our contact information for any further questions or concerns and of course, as always, we are available in the interim.       The patient was seen with and the above assessment and plan was discussed with staff physician Dr. Albert Mayers MD who agreed with the same.    Emeka Cooney   PGY-6 Fellow, Hematology,Oncology and BMT  [facility]     ----------------------    Physician Attestation    IAlbert, saw and evaluated Deann Durand in-person and agree  with the resident/ fellow documentation by Dr. Cooney.    I have reviewed and discussed with them the history, physical exam, and plan.  I personally reviewed the vital signs, interval events, medications, labs and imaging.      I personally performed the substantive portion of the medical decision making for this visit - please see the full documentation for full details.      Key management decisions made by me and carried out under my direction:   Marie 62 yo woman with smoking/ emphysema with initially incidental gallbladder cancer, found to have pT2N2 disease with several high risk features (multiple LN +, PNI, LVI, poorly differentiated) on final path.    She is recovering well from surgery.    We met today with daughter and discussed role of adjuvant therapy.    We discussed BILCAP trial and role of capecitabine.  We discussed role of radiation, and how it could add benefit, but also visits.    They decided to pursue adjuvant capecitabine.    We will send Nicky NGS on surgical sample from 3/7/24.    We will refer for germline testing given strong family history of cancer.    Repeat labs and CT CAP in next 1-2 weeks.  Start cape 1000 mg bid (D1-14, q21 days) with REJI visit around C1D1.  Starting lower dose given her low weight.  Next cycles (virtual vs. in person, where to do labs prior etc) TBD at that first REJI visit. Her wifi may be weak in house, daughter lives in [place] etc.    Counseled re: smoking but no current plans to quit.            Again, thank you for allowing me to participate in the care of your patient.        Sincerely,        MALIK Varela CNP

## 2024-04-16 NOTE — PROGRESS NOTES
[facility]  Medical Oncology Follow Up Note  Apr 16, 2024      Patient name: Deann Durand  YOB: 1960      Oncologic History:  Diagnosis: Localized, resected (R0) gallbladder cancer (adenocarcinoma), negative (R0) margins, involvement of perimuscular adipose tissue on peritoneal side, poorly differentiated with lymphovascular invasion (LVI) present, 4/4 portal LN involved  Stage of cancer: [id number]     Prior treatment(s):   - 1) laparoscopic cholecystectomy on 2/2/24  - 2) Laparoscopic re-exploration with resection of liver (segment 4b/5) and portal lymphadenectomy on 3/7/24    Current treatment(s):  Adjuvant systemic therapy with oral capecitabine x 6 months (planned)    Treatment intent:  Curative     Care Team  Medical oncologist: Albert Mayers MD ([facility])  Surgical oncologist:  Tacho Harris MD ([facility]) and Silviano Hamilton MD ([facility])  Other members of care team: Dr. Munoz (General Surgery at [facility]) , Domi Darden (PCP[place] [facility]   Primary caregiver at home/ contact:  Libra Beard (daughter) at [phone]    Reason for consultation/ patient visit:  Consideration of adjuvant therapy for resected gallbladder cancer     History of presenting illness:  Ms. Deann Durand is a 63 year old female with recent diagnosis of gallbladder cancer (now resected x 2) who presents to establish medical oncology care. Her oncologic history (as obtained from chart review, patient interview) is summarized below  -    12/23 and 1/24 - presented with chronic, vague abdominal pain and dyspepsia. Diagnosed with symptomatic cholelithiasis.   -2/2/24 : laparoscopic [facility]cyst[facility] at [facility]. Pathology showed poorly differentiated adenocarcinoma of gallbladder, 2.4cm in size, negative margins in cystic duct and liver side. pT2a with perimuscular and lymphovascular invasion noted.   -2/13/24: CT CAP - no evidence of metastatic disease.    -2/21/24 : Saw surgeon Dr. Tacho Harris at [facility], recommended repeat surgery with partial hepatic resection and portal lymphadenectomy.   -2/26/24 : Saw surgeon Dr. Silviano Hamilton at [facility], concurred with above.   -3/07/24 : underwent laparoscopic liver resection (of segment 4b/5) and portal lympadenectomy  -3/25/24 : visit with Med oncology to discuss adjuvant therapy    -4/11/24: new baseline CT CAP demonstrating no evidence of metastatic disease within the abdomen or pelvis, mildly prominent soft tissue at the eduard hepatis which may be postsurgical in etiology or a prominent eduard hepatic lymph node, and borderline enlarged gastrohepatic lymph node.         Interval history:  Sparkle presents today for follow up prior to starting capecitabine accompanied by her daughter Libra. Her prescription is scheduled to be delivered on 4/18. She is planning on starting her cycle on Sunday 4/21.   -No abdominal pain, surgical incision is well healed.   -energy level is improving, doing more activity around the house and yard work. She is scheduled to return to work on Monday and is concerned with just starting treatment. (Works third shift at [employer])  -Appetite is good, denies any nausea, reflux or bowel concerns. Currently still taking Protonix, has famotidine to start.   -still smoking, averaging 1/2 pack per day but trying to reduce. Went 4 days last week without a cigarette       Review of Systems:  Patient denies any of the following except if noted above: fevers, chills, difficulty with energy, vision or hearing changes, chest pain, dyspnea, abdominal pain, nausea, vomiting, diarrhea, constipation, urinary concerns, headaches, numbness, tingling, issues with sleep or mood. Also denies lumps, bumps, rashes or skin lesions, bleeding or bruising issues.           Past medical history:  GERD  Bilateral carpal tunnel syndrome    Past surgical history:  Laparoscopic Cholecystectomy (2/2/24)  Laparoscopic Liver  resection (seg 4b/5) and portal lymphadenectomy (3/7/24)  Bladder suspension   cervical disc arthroplasty   hysterectomy   wisdom teeth extraction    Social history:   Currently lives independently in [place], [place]. Works at [employer] in [place], worked there for last 5 years. Jeet Smyth lives in [place], [place] (30min away from her) and involved in her care, also has health issues of her own. She also has two neices in [place] who check in on her. Loves to garden. 2 daughters, 4 grandkids,  Former smoker, 45 pack year history, currently smoking 1pack per day.       Family history:  EtOh use disorder in her birth father, birth mother, and brother;   Cancer -  father -throat cacner, uncle had colon cancer, maternal grandmother - throat cancer, and sister - lung cancer, another sister - stomach and colon cancer;   Dementia in her sister.     Allergies:   Allergies   Allergen Reactions    Morphine Nausea and Vomiting       Outpatient medications:     Current Outpatient Medications:     capecitabine (XELODA) 500 MG tablet, Take 2 tablets (1,000 mg) by mouth 2 times daily Days 1 through 14, then off for 7 days.Take with water within 30 minutes after a meal., Disp: 56 tablet, Rfl: 0    escitalopram (LEXAPRO) 10 MG tablet, Take 1 tablet by mouth daily at 2 pm, Disp: , Rfl:     famotidine (PEPCID) 20 MG tablet, Take 1 tablet (20 mg) by mouth 2 times daily, Disp: 60 tablet, Rfl: 3    MULTIPLE VITAMINS-MINERALS PO, Take 1 tablet by mouth daily, Disp: , Rfl:     omeprazole (PRILOSEC) 20 MG DR capsule, Take 20 mg by mouth daily, Disp: , Rfl:     ondansetron (ZOFRAN) 4 MG tablet, Take 4 mg by mouth every 6 hours as needed for nausea or vomiting, Disp: , Rfl:     prochlorperazine (COMPAZINE) 10 MG tablet, Take 1 tablet (10 mg) by mouth every 6 hours as needed for nausea or vomiting, Disp: 30 tablet, Rfl: 2    traZODone (DESYREL) 50 MG tablet, Take 50 mg by mouth nightly as needed for sleep, Disp: , Rfl:          Physical Exam:  General: The patient is a pleasant female in no acute distress.  /75 (BP Location: Right arm, Patient Position: Sitting, Cuff Size: Adult Regular)   Pulse 88   Temp 97.8  F (36.6  C) (Tympanic)   Resp 16   Wt 61.6 kg (135 lb 14.4 oz)   SpO2 94%   BMI 24.46 kg/m    Wt Readings from Last 10 Encounters:   04/16/24 61.6 kg (135 lb 14.4 oz)   04/01/24 61.2 kg (135 lb)   02/26/24 63.8 kg (140 lb 11.2 oz)   HEENT: EOMI. Sclerae are anicteric. Oral mucosa is pink and moist without lesions or thrush.   Lymph: Neck is supple with no lymphadenopathy in the cervical or supraclavicular areas.   Heart: Regular rate and rhythm.   Lungs: Clear to auscultation bilaterally, normal work of breathing   Abdomen: Bowel sounds present, soft, nontender with no palpable hepatosplenomegaly or masses.   Extremities: No lower extremity edema noted bilaterally.   Neuro: Cranial nerves II through XII are grossly intact.  Skin: No rashes, petechiae, or bruising noted on exposed skin.      Lab data:    Most Recent 3 CBC's:  Recent Labs   Lab Test 04/11/24  1407   WBC 7.9   HGB 14.4   MCV 92      ANEUTAUTO 3.9     Most Recent 3 BMP's:  Recent Labs   Lab Test 04/11/24  1407      POTASSIUM 3.6   CHLORIDE 102   CO2 25   BUN 13.3   CR 1.17*   ANIONGAP 13   RAMEZ 9.6   GLC 94   PROTTOTAL 7.5   ALBUMIN 4.3    Most Recent 3 LFT's:  Recent Labs   Lab Test 04/11/24  1407   AST 26   ALT 18   ALKPHOS 173*   BILITOTAL 0.4    Most Recent 2 TSH and T4:No lab results found.  I reviewed the above labs today.      Radiology data:    EXAM: CT CHEST/ABDOMEN/PELVIS W CONTRAST  LOCATION: [facility]  DATE: 4/11/2024    IMPRESSION:  No definite findings of metastatic disease within the abdomen or pelvis. Mildly prominent soft tissue at the eduard hepatis which may be postsurgical in etiology or a prominent eduard hepatic lymph node. Borderline enlarged gastrohepatic lymph node.     I reviewed the above  imaging report today.         Assessment and Plan:    Gallbladder adenocarcinoma.   Ms. Deann Durand is a 63 year old female with prior history of chronic smoking who presented initially with dyspepsia and abdominal pain, underwent cholecystectomy for what was presumed to be symptomatic cholelithiasis and was diagnosed with gallbladder cancer after it was incidentally discovered on histopathological analysis of cholecystectomy specimen. She underwent repeat surgery with resection of liver bed(seg 4b and 5) and portal lymphadenectomy with evidence of clear margins but involvement of portal LN.    She presents today for follow up prior to initiating adjuvant capecitabine therapy. New baseline CT CAP was obtained 4/11 with no evidence of of metastatic disease within the abdomen or pelvis, mildly prominent soft tissue at the eduard hepatis which may be postsurgical in etiology or a prominent eduard hepatic lymph node, and borderline enlarged gastrohepatic lymph node.    Capecitabine scheduled for delivery on 4/18, she will start cycle 1 on 4/21. Discussed capecitabine schedule and side effects as well as their management including myelosuppression, nausea, vomiting, diarrhea, hand/foot syndrome, mouth sores, and fatigue. We discussed the use of antiemetics, antidiarrheals, skin care, salt/soda swishes, and calling triage with a temperature of 100.4F or higher. Sparkle and Libra deny any further questions today.   -4/11 labs reviewed. Ok to start capecitabine 1000mg BID on 4/21.   -Return to clinic in 3 weeks for labs and REJI follow up prior to cycle 2.   -Plan for repeat imaging and MD follow up in 3 months.     Smoking.   Currently working on reducing amount of cigarettes daily, but not interested in cessation right now. Offered resources available, she will let us know if she would like to pursue.       GERD.   Currently taking Protonix daily. Has not started the tapering schedule of alternating famotidine and Protonix  in order to discontinue Protonix. Will start alternating tomorrow and discontinue Protonix on 4/21 when starting capecitabine.       The longitudinal plan of care for the diagnosis(es)/condition(s) as documented were addressed during this visit. Due to the added complexity in care, I will continue to support Sparkle in the subsequent management and with ongoing continuity of care.    55 minutes spent on the date of the encounter doing chart review, review of test results, interpretation of tests, patient visit, and documentation     MALIK Varela CNP

## 2024-04-17 ENCOUNTER — TELEPHONE (OUTPATIENT)
Dept: SURGERY | Facility: HOSPITAL | Age: 64
End: 2024-04-17
Payer: COMMERCIAL

## 2024-04-17 NOTE — TELEPHONE ENCOUNTER
Request for medical information forms received via rightfax from [facility].      Forms to be completed and put in folder for provider to approve.    Fax #:  [phone]  Claim: [id number]    Danelle Harris

## 2024-04-18 NOTE — TELEPHONE ENCOUNTER
Called [facility] because we don't have any previous paperwork for this patient.  It appears the STD claim was for the MD who actually did her surgery - not Dr. Hamilton.  [facility] is aware of the error.

## 2024-04-26 ENCOUNTER — TELEPHONE (OUTPATIENT)
Dept: ONCOLOGY | Facility: CLINIC | Age: 64
End: 2024-04-26
Payer: COMMERCIAL

## 2024-04-28 DIAGNOSIS — C23 MALIGNANT NEOPLASM OF GALLBLADDER (H): Primary | ICD-10-CM

## 2024-04-29 ENCOUNTER — TELEPHONE (OUTPATIENT)
Dept: ONCOLOGY | Facility: CLINIC | Age: 64
End: 2024-04-29
Payer: COMMERCIAL

## 2024-04-29 NOTE — ORAL ONC MGMT
"Oral Chemotherapy Monitoring Program    Subjective/Objective:  Deann Durand is a 63 year old female contacted by phone for a follow-up visit for oral chemotherapy.  Sparkle confirms taking the appropriate dose of capecitabine 1000mg twice daily for 14 days on and 7 days off. Confirms taking medication after meals. Confirms starting cycle 1 on Sunday 4/21/2024. Denies new or worsening side effects. She notes one morning with some mild nausea, but did not need any antinausea medications. Denies any diarrhea/constipation, rash, HFS, skin changes, or other side effects. She stated, \"it feels like a placebo\", so she is very pleased after hearing how the side effects could be, she feels like she is tolerating well. She reports missing one morning dose on day 6 since she fell back asleep and decided to skip the dose since it was much later that day. No adherence concerns. Denies any recent hospital or ED visits. Patient tapered off Protonix and started famotidine due to drug drug interactions with capecitabine. Denies any other recent medication changes. Discussed oral chemo program. She is aware of next clinic appointments.         4/2/2024    11:00 AM 4/3/2024    10:00 AM 4/29/2024    11:00 AM   ORAL CHEMOTHERAPY   Assessment Type Initial Work up New Teach Initial Follow up   Diagnosis Code Gallbladder Cancer Gallbladder Cancer Gallbladder Cancer   Providers Dr. Talib Mayers   Clinic Name/Location [facility]   Is this patient followed by the [facility] OC team?   No   Drug Name Xeloda (capecitabine) Xeloda (capecitabine) Xeloda (capecitabine)   Dose 1,000 mg 1,000 mg 1,000 mg   Current Schedule BID BID BID   Cycle Details 2 weeks on, 1 week off 2 weeks on, 1 week off 2 weeks on, 1 week off   Start Date of Last Cycle   4/21/2024   Planned next cycle start date   5/12/2024   Doses missed in last 2 weeks   1   Adherence Assessment   Adherent   Adverse Effects   No AE identified during assessment   Any " "new drug interactions?   No   Since the last time we talked, have you been hospitalized or used the emergency room?   No       Last PHQ-2 Score on record:        No data to display                Vitals:  BP:   BP Readings from Last 1 Encounters:   04/16/24 125/75     Wt Readings from Last 1 Encounters:   04/16/24 61.6 kg (135 lb 14.4 oz)     Estimated body surface area is 1.65 meters squared as calculated from the following:    Height as of 4/1/24: 1.588 m (5' 2.5\").    Weight as of 4/16/24: 61.6 kg (135 lb 14.4 oz).    Labs:  _  Result Component Current Result Ref Range   Sodium 140 (4/11/2024) 135 - 145 mmol/L     _  Result Component Current Result Ref Range   Potassium 3.6 (4/11/2024) 3.4 - 5.3 mmol/L     _  Result Component Current Result Ref Range   Calcium 9.6 (4/11/2024) 8.8 - 10.2 mg/dL   _  Result Component Current Result Ref Range   Albumin 4.3 (4/11/2024) 3.5 - 5.2 g/dL     _  Result Component Current Result Ref Range   Urea Nitrogen 13.3 (4/11/2024) 8.0 - 23.0 mg/dL     _  Result Component Current Result Ref Range   Creatinine 1.17 (H) (4/11/2024) 0.51 - 0.95 mg/dL     _  Result Component Current Result Ref Range   AST 26 (4/11/2024) 0 - 45 U/L     _  Result Component Current Result Ref Range   ALT 18 (4/11/2024) 0 - 50 U/L     _  Result Component Current Result Ref Range   Bilirubin Total 0.4 (4/11/2024) <=1.2 mg/dL     _  Result Component Current Result Ref Range   WBC Count 7.9 (4/11/2024) 4.0 - 11.0 10e3/uL     _  Result Component Current Result Ref Range   Hemoglobin 14.4 (4/11/2024) 11.7 - 15.7 g/dL   _  Result Component Current Result Ref Range   Platelet Count 236 (4/11/2024) 150 - 450 10e3/uL     _  Result Component Current Result Ref Range   Absolute Neutrophils 3.9 (4/11/2024) 1.6 - 8.3 10e3/uL      Assessment/Plan:  Denies new side effects since starting cycle 1 on 4/21/2024.     Follow-Up:  5/8: clinic visit and labs    Refill Due:  Cycle 2 start 5/12/2024     Malcolm Simpson, " PharmD  Hematology/Oncology Clinical Pharmacist  [facility]  [facility]  [phone]

## 2024-05-02 NOTE — TELEPHONE ENCOUNTER
STD paperwork completed, checked for accuracy, signed and faxed to [facility] @ [phone]. A copy was made, sent to scanning and original mailed to patient at home address.    Successful transmission verified in Right Fax.      Ny Cruz

## 2024-05-03 DIAGNOSIS — C23 MALIGNANT NEOPLASM OF GALLBLADDER (H): Primary | ICD-10-CM

## 2024-05-03 RX ORDER — CAPECITABINE 500 MG/1
1000 TABLET, FILM COATED ORAL 2 TIMES DAILY
Qty: 56 TABLET | Refills: 0 | Status: SHIPPED | OUTPATIENT
Start: 2024-05-06 | End: 2024-05-20

## 2024-05-08 ENCOUNTER — ONCOLOGY VISIT (OUTPATIENT)
Dept: ONCOLOGY | Facility: CLINIC | Age: 64
End: 2024-05-08
Payer: COMMERCIAL

## 2024-05-08 ENCOUNTER — APPOINTMENT (OUTPATIENT)
Dept: LAB | Facility: CLINIC | Age: 64
End: 2024-05-08
Payer: COMMERCIAL

## 2024-05-08 VITALS
DIASTOLIC BLOOD PRESSURE: 78 MMHG | HEART RATE: 90 BPM | SYSTOLIC BLOOD PRESSURE: 126 MMHG | BODY MASS INDEX: 25 KG/M2 | WEIGHT: 138.9 LBS | TEMPERATURE: 99.2 F | OXYGEN SATURATION: 97 % | RESPIRATION RATE: 16 BRPM

## 2024-05-08 DIAGNOSIS — C23 MALIGNANT NEOPLASM OF GALLBLADDER (H): Primary | ICD-10-CM

## 2024-05-08 LAB
ALBUMIN SERPL BCG-MCNC: 4.1 G/DL (ref 3.5–5.2)
ALP SERPL-CCNC: 156 U/L (ref 40–150)
ALT SERPL W P-5'-P-CCNC: 11 U/L (ref 0–50)
ANION GAP SERPL CALCULATED.3IONS-SCNC: 11 MMOL/L (ref 7–15)
AST SERPL W P-5'-P-CCNC: 23 U/L (ref 0–45)
BASOPHILS # BLD AUTO: 0.1 10E3/UL (ref 0–0.2)
BASOPHILS NFR BLD AUTO: 1 %
BILIRUB SERPL-MCNC: 0.5 MG/DL
BUN SERPL-MCNC: 17 MG/DL (ref 8–23)
CALCIUM SERPL-MCNC: 8.9 MG/DL (ref 8.8–10.2)
CHLORIDE SERPL-SCNC: 110 MMOL/L (ref 98–107)
CREAT SERPL-MCNC: 0.77 MG/DL (ref 0.51–0.95)
DEPRECATED HCO3 PLAS-SCNC: 21 MMOL/L (ref 22–29)
EGFRCR SERPLBLD CKD-EPI 2021: 86 ML/MIN/1.73M2
EOSINOPHIL # BLD AUTO: 0.3 10E3/UL (ref 0–0.7)
EOSINOPHIL NFR BLD AUTO: 4 %
ERYTHROCYTE [DISTWIDTH] IN BLOOD BY AUTOMATED COUNT: 13.6 % (ref 10–15)
GLUCOSE SERPL-MCNC: 100 MG/DL (ref 70–99)
HCT VFR BLD AUTO: 40.2 % (ref 35–47)
HGB BLD-MCNC: 13.5 G/DL (ref 11.7–15.7)
IMM GRANULOCYTES # BLD: 0 10E3/UL
IMM GRANULOCYTES NFR BLD: 0 %
LYMPHOCYTES # BLD AUTO: 2.5 10E3/UL (ref 0.8–5.3)
LYMPHOCYTES NFR BLD AUTO: 33 %
MCH RBC QN AUTO: 30.8 PG (ref 26.5–33)
MCHC RBC AUTO-ENTMCNC: 33.6 G/DL (ref 31.5–36.5)
MCV RBC AUTO: 92 FL (ref 78–100)
MONOCYTES # BLD AUTO: 0.7 10E3/UL (ref 0–1.3)
MONOCYTES NFR BLD AUTO: 10 %
NEUTROPHILS # BLD AUTO: 4 10E3/UL (ref 1.6–8.3)
NEUTROPHILS NFR BLD AUTO: 52 %
NRBC # BLD AUTO: 0 10E3/UL
NRBC BLD AUTO-RTO: 0 /100
PLATELET # BLD AUTO: 303 10E3/UL (ref 150–450)
POTASSIUM SERPL-SCNC: 4 MMOL/L (ref 3.4–5.3)
PROT SERPL-MCNC: 7.1 G/DL (ref 6.4–8.3)
RBC # BLD AUTO: 4.39 10E6/UL (ref 3.8–5.2)
SODIUM SERPL-SCNC: 142 MMOL/L (ref 135–145)
WBC # BLD AUTO: 7.6 10E3/UL (ref 4–11)

## 2024-05-08 PROCEDURE — G2211 COMPLEX E/M VISIT ADD ON: HCPCS

## 2024-05-08 PROCEDURE — 36415 COLL VENOUS BLD VENIPUNCTURE: CPT

## 2024-05-08 PROCEDURE — 99214 OFFICE O/P EST MOD 30 MIN: CPT

## 2024-05-08 PROCEDURE — 85025 COMPLETE CBC W/AUTO DIFF WBC: CPT

## 2024-05-08 PROCEDURE — 80053 COMPREHEN METABOLIC PANEL: CPT

## 2024-05-08 ASSESSMENT — PAIN SCALES - GENERAL: PAINLEVEL: NO PAIN (0)

## 2024-05-08 NOTE — NURSING NOTE
"Oncology Rooming Note    May 8, 2024 9:57 AM   Deann Durand is a 63 year old female who presents for:    Chief Complaint   Patient presents with    Oncology Clinic Visit     Malignant neoplasm of gallbladder      Initial Vitals: /78   Pulse 90   Temp 99.2  F (37.3  C) (Oral)   Resp 16   Wt 63 kg (138 lb 14.4 oz)   SpO2 97%   BMI 25.00 kg/m   Estimated body mass index is 25 kg/m  as calculated from the following:    Height as of 4/1/24: 1.588 m (5' 2.5\").    Weight as of this encounter: 63 kg (138 lb 14.4 oz). Body surface area is 1.67 meters squared.  No Pain (0) Comment: Data Unavailable   No LMP recorded. (Menstrual status: UNKNOWN).  Allergies reviewed: Yes  Medications reviewed: Yes    Medications: Medication refills not needed today.  Pharmacy name entered into [employer]:    [facility] - [place], [place] - [address], [place] - [address] [place] [address]    Frailty Screening:   Is the patient here for a new oncology consult visit in cancer care? 2. No      Clinical concerns: Pt has had a minor HA since she stopped taking her chemo pills and has noticed a lot of shaking in both hands.        Florentino Casey              "

## 2024-05-08 NOTE — PROGRESS NOTES
[facility]  Medical Oncology Follow Up Note  May 8, 2024      Patient name: Deann Durand  YOB: 1960      Oncologic History:  Diagnosis: Localized, resected (R0) gallbladder cancer (adenocarcinoma), negative (R0) margins, involvement of perimuscular adipose tissue on peritoneal side, poorly differentiated with lymphovascular invasion (LVI) present, 4/4 portal LN involved  Stage of cancer: [id number]     Prior treatment(s):   - 1) laparoscopic cholecystectomy on 2/2/24  - 2) Laparoscopic re-exploration with resection of liver (segment 4b/5) and portal lymphadenectomy on 3/7/24    Current treatment(s):  Adjuvant systemic therapy with oral capecitabine x 6 months (planned)    Treatment intent:  Curative     Care Team  Medical oncologist: Albert Mayers MD ([facility])  Surgical oncologist:  Tacho Harris MD ([facility]) and Silviano Hamilton MD ([facility])  Other members of care team: Dr. Munoz (General Surgery at [facility]) , Domi Darden (PCP) at [facility]   Primary caregiver at home/ contact:  Libra Beard (daughter) at [phone]    Reason for consultation/ patient visit:  Consideration of adjuvant therapy for resected gallbladder cancer     History of presenting illness:  Ms. Deann Durand is a 63 year old female with recent diagnosis of gallbladder cancer (now resected x 2) who presents to establish medical oncology care. Her oncologic history (as obtained from chart review, patient interview) is summarized below  -    12/23 and 1/24 - presented with chronic, vague abdominal pain and dyspepsia. Diagnosed with symptomatic cholelithiasis.   -2/2/24 : laparoscopic cholecyst[facility] with Dr. Munoz ([facility] at [facility]. Pathology showed poorly differentiated adenocarcinoma of gallbladder, 2.4cm in size, negative margins in cystic duct and liver side. pT2a with perimuscular and lymphovascular invasion noted.   -2/13/24: CT CAP - no evidence of metastatic disease.    -2/21/24 : Saw surgeon Dr. Tacho Harris at [facility], recommended repeat surgery with partial hepatic resection and portal lymphadenectomy.   -2/26/24 : Saw surgeon Dr. Silviano Hamilton at [facility], concurred with above.   -3/07/24 : underwent laparoscopic liver resection (of segment 4b/5) and portal lympadenectomy  -3/25/24 : visit with Med oncology to discuss adjuvant therapy    -4/11/24: new baseline CT CAP demonstrating no evidence of metastatic disease within the abdomen or pelvis, mildly prominent soft tissue at the eduard hepatis which may be postsurgical in etiology or a prominent eduard hepatic lymph node, and borderline enlarged gastrohepatic lymph node.       Sparkle presents today for follow up and consideration of cycle 2 capecitabine.     Interval history:    -finished cycle 1 on Sunday 5/5. Next cycle is scheduled to be delivered and she plans to start cycle 2 on Monday 5/13.  -tolerated cycle 1 well with no noted side effects.   -appetite has been good, eating well. No nausea, abdominal pain, or reflux. She is now taking famotidine daily. Bowel movements at times are looser than normal, but not consistently and no diarrhea.   -has had an intermittent mild headache over the last week in her temples. No vision or neuro changes. No changes in diet, caffeine intake  and drinking adequate fluids. She has not taken anything for the headache. Reports it is mild and comes and goes. No sinus congestion or cough. Has a mild rhinorrhea intermittently. No fever, chills or concerns for infection.   -energy level is good, she continues to be active and is enjoying a lot of yard work with the nice weather       Review of Systems:  Patient denies any of the following except if noted above: fevers, chills, difficulty with energy, vision or hearing changes, chest pain, dyspnea, abdominal pain, nausea, vomiting, diarrhea, constipation, urinary concerns, headaches, numbness, tingling, issues with sleep or mood. Also denies lumps, bumps,  rashes or skin lesions, bleeding or bruising issues.           Past medical history:  GERD  Bilateral carpal tunnel syndrome    Past surgical history:  Laparoscopic Cholecystectomy (2/2/24)  Laparoscopic Liver resection (seg 4b/5) and portal lymphadenectomy (3/7/24)  Bladder suspension   cervical disc arthroplasty   hysterectomy   wisdom teeth extraction    Social history:   Currently lives independently in [place], [place]. Works at [employer] in [place], worked there for last 5 years. Jeet Smyth lives in [place], [place] (30min away from her) and involved in her care, also has health issues of her own. She also has two neices in [place] who check in on her. Loves to garden. 2 daughters, 4 grandkids,  Former smoker, 45 pack year history, currently smoking 1pack per day.       Family history:  EtOh use disorder in her birth father, birth mother, and brother;   Cancer -  father -throat cacner, uncle had colon cancer, maternal grandmother - throat cancer, and sister - lung cancer, another sister - stomach and colon cancer;   Dementia in her sister.     Allergies:   Allergies   Allergen Reactions    Morphine Nausea and Vomiting    Nicotine Dizziness       Outpatient medications:     Current Outpatient Medications:     acetaminophen (TYLENOL) 325 MG tablet, Take 3 tablets by mouth 3 times daily, Disp: , Rfl:     albuterol (PROAIR HFA/PROVENTIL HFA/VENTOLIN HFA) 108 (90 Base) MCG/ACT inhaler, Inhale 1-2 puffs into the lungs every 4 hours as needed, Disp: , Rfl:     capecitabine (XELODA) 500 MG tablet, Take 2 tablets (1,000 mg) by mouth 2 times daily for 14 days Days 1 through 14, then off for 7 days.Take with water within 30 minutes after a meal., Disp: 56 tablet, Rfl: 0    cyclobenzaprine (FLEXERIL) 5 MG tablet, Take 5 mg by mouth 2 times daily as needed, Disp: , Rfl:     escitalopram (LEXAPRO) 10 MG tablet, Take 1 tablet by mouth daily at 2 pm, Disp: , Rfl:     famotidine (PEPCID) 20 MG tablet, Take 1 tablet  (20 mg) by mouth 2 times daily (Patient not taking: Reported on 4/16/2024), Disp: 60 tablet, Rfl: 3    lidocaine (LIDODERM) 5 % patch, Place onto the skin every 24 hours, Disp: , Rfl:     MULTIPLE VITAMINS-MINERALS PO, Take 1 tablet by mouth daily, Disp: , Rfl:     omeprazole (PRILOSEC) 20 MG DR capsule, Take 20 mg by mouth daily, Disp: , Rfl:     ondansetron (ZOFRAN) 4 MG tablet, Take 4 mg by mouth every 6 hours as needed for nausea or vomiting, Disp: , Rfl:     oxyCODONE (ROXICODONE) 5 MG tablet, Take 5 mg by mouth every 6 hours as needed, Disp: , Rfl:     polyethylene glycol (MIRALAX) 17 GM/Dose powder, Take 17 g by mouth daily, Disp: , Rfl:     prochlorperazine (COMPAZINE) 10 MG tablet, Take 1 tablet (10 mg) by mouth every 6 hours as needed for nausea or vomiting, Disp: 30 tablet, Rfl: 2    senna (SENOKOT) 8.6 MG tablet, Take 8.6 mg by mouth 2 times daily as needed, Disp: , Rfl:     traZODone (DESYREL) 50 MG tablet, Take 50 mg by mouth nightly as needed for sleep, Disp: , Rfl:         Physical Exam:  General: The patient is a pleasant female in no acute distress.  /78   Pulse 90   Temp 99.2  F (37.3  C) (Oral)   Resp 16   Wt 63 kg (138 lb 14.4 oz)   SpO2 97%   BMI 25.00 kg/m    Wt Readings from Last 10 Encounters:   05/08/24 63 kg (138 lb 14.4 oz)   04/16/24 61.6 kg (135 lb 14.4 oz)   04/01/24 61.2 kg (135 lb)   02/26/24 63.8 kg (140 lb 11.2 oz)   HEENT: EOMI. Sclerae are anicteric. Oral mucosa is pink and moist without lesions or thrush.   Lymph: Neck is supple with no lymphadenopathy in the cervical or supraclavicular areas.   Heart: Regular rate and rhythm.   Lungs: Clear to auscultation bilaterally, normal work of breathing   Abdomen: Bowel sounds present, soft, nontender with no palpable hepatosplenomegaly or masses.   Extremities: No lower extremity edema noted bilaterally.   Neuro: Cranial nerves II through XII are grossly intact.  Skin: No rashes, petechiae, or bruising noted on exposed  skin.      Lab data:    Most Recent 3 CBC's:  Recent Labs   Lab Test 05/08/24  0950 04/11/24  1407   WBC 7.6 7.9   HGB 13.5 14.4   MCV 92 92    236   ANEUTAUTO 4.0 3.9     Most Recent 3 BMP's:  Recent Labs   Lab Test 05/08/24  0950 04/11/24  1407    140   POTASSIUM 4.0 3.6   CHLORIDE 110* 102   CO2 21* 25   BUN 17.0 13.3   CR 0.77 1.17*   ANIONGAP 11 13   RAMEZ 8.9 9.6   * 94   PROTTOTAL 7.1 7.5   ALBUMIN 4.1 4.3    Most Recent 3 LFT's:  Recent Labs   Lab Test 05/08/24  0950 04/11/24  1407   AST 23 26   ALT 11 18   ALKPHOS 156* 173*   BILITOTAL 0.5 0.4    Most Recent 2 TSH and T4:No lab results found.  I reviewed the above labs today.            Assessment and Plan:    Gallbladder adenocarcinoma.   Ms. Deann Durand is a 63 year old female with prior history of chronic smoking who presented initially with dyspepsia and abdominal pain, underwent cholecystectomy for what was presumed to be symptomatic cholelithiasis and was diagnosed with gallbladder cancer after it was incidentally discovered on histopathological analysis of cholecystectomy specimen. She underwent repeat surgery with resection of liver bed(seg 4b and 5) and portal lymphadenectomy with evidence of clear margins but involvement of portal LN.    New baseline CT CAP was obtained 4/11 with no evidence of of metastatic disease within the abdomen or pelvis, mildly prominent soft tissue at the eduard hepatis which may be postsurgical in etiology or a prominent eduard hepatic lymph node, and borderline enlarged gastrohepatic lymph node.    Initiated adjuvant therapy with capecitabine (1000mg BID) on 4/21. Tolerated cycle 1 well with no noted side effects. Today's labs reviewed, proceed with cycle 2 as planned. Return to clinic in 3 weeks with labs and REJI visit prior to cycle 3. Patient to call sooner with any questions or concerns. Plan for repeat imaging and MD follow up in 3 months, plan for 8 cycles total.       Smoking.   Currently  working on reducing amount of cigarettes daily, but not interested in cessation right now. Offered resources available, she will let us know if she would like to pursue.       GERD.   Previously on Protonix daily. Transitioned to famotidine, taking daily with no issues.       The longitudinal plan of care for the diagnosis(es)/condition(s) as documented were addressed during this visit. Due to the added complexity in care, I will continue to support Sparkle in the subsequent management and with ongoing continuity of care.       MALIK Varela CNP

## 2024-05-08 NOTE — NURSING NOTE
Chief Complaint   Patient presents with    Oncology Clinic Visit     Malignant neoplasm of gallbladder     Blood Draw     Vitals, blood drawn via VPT by LPN. Pt checked into appt.      DARIEN Umanzor LPN

## 2024-05-08 NOTE — Clinical Note
5/8/2024         RE: Deann Durand  [address]  [facility] [address]        Dear Colleague,    Thank you for referring your patient, Deann Durand, to the [facility]. Please see a copy of my visit note below.    [facility]  Medical Oncology Follow Up Note  May 8, 2024      Patient name: Deann Durand  YOB: 1960      Oncologic History:  Diagnosis: Localized, resected (R0) gallbladder cancer (adenocarcinoma), negative (R0) margins, involvement of perimuscular adipose tissue on peritoneal side, poorly differentiated with lymphovascular invasion (LVI) present, 4/4 portal LN involved  Stage of cancer: [id number]     Prior treatment(s):   - 1) laparoscopic cholecystectomy on 2/2/24  - 2) Laparoscopic re-exploration with resection of liver (segment 4b/5) and portal lymphadenectomy on 3/7/24    Current treatment(s):  Adjuvant systemic therapy with oral capecitabine x 6 months (planned)    Treatment intent:  Curative     Care Team  Medical oncologist: Albert Mayers MD ([facility])  Surgical oncologist:  Tacho Harris MD ([facility]) and Silviano Hamilton MD ([facility])  Other members of care team: Dr. Munoz (General Surgery at [facility]) , Domi Darden (PCP) at [facility]   Primary caregiver at home/ contact:  Libra Beard (daughter) at [phone]    Reason for consultation/ patient visit:  Consideration of adjuvant therapy for resected gallbladder cancer     History of presenting illness:  Ms. Deann Durand is a 63 year old female with recent diagnosis of gallbladder cancer (now resected x 2) who presents to establish medical oncology care. Her oncologic history (as obtained from chart review, patient interview) is summarized below  -    12/23 and 1/24 - presented with chronic, vague abdominal pain and dyspepsia. Diagnosed with symptomatic cholelithiasis.   -2/2/24 : laparoscopic cholecystectomy with Dr. Munoz ([facility]) at [facility].  Pathology showed poorly differentiated adenocarcinoma of gallbladder, 2.4cm in size, negative margins in cystic duct and liver side. pT2a with perimuscular and lymphovascular invasion noted.   -2/13/24: CT CAP - no evidence of metastatic disease.   -2/21/24 : [place]. Tacho [place] [facility] recommended repeat surgery with partial hepatic resection and portal lymphadenectomy.   -2/26/24 : Saw surgeon Dr. Silviano Hamilton at [facility], concurred with above.   -3/07/24 : underwent laparoscopic liver resection (of segment 4b/5) and portal lympadenectomy  -3/25/24 : visit with Med oncology to discuss adjuvant therapy    -4/11/24: new baseline CT CAP demonstrating no evidence of metastatic disease within the abdomen or pelvis, mildly prominent soft tissue at the eduard hepatis which may be postsurgical in etiology or a prominent eduard hepatic lymph node, and borderline enlarged gastrohepatic lymph node.       Sparkle presents today for follow up and consideration of cycle 2 capecitabine.     Interval history:    Headache  Shaking in hands     Appetite has been good. Gained a couple pounds.   Headache since Thursday (~1 week). Mild. Comes and goes. Hasn't taken anything. Temples, alteranting sides  Ear started hurting this morning.     Mild runny nose. No nasal congestion.     No fevers, chills,     Has always been shakey, has been mild and stable for a logn time, but thinks its a littleworse since antidepressant.     Taking pepcid now, working well. No issues with reflux.     No pain today.         Finished cycle 1 on Sunday, will restart on Monday per plan.  Delivery scheduled for ***     ***   Sparkle presents today for follow up prior to starting capecitabine accompanied by her daughter Libra. Her prescription is scheduled to be delivered on 4/18. She is planning on starting her cycle on Sunday 4/21.   -No abdominal pain, surgical incision is well healed.   -energy level is improving, doing more activity around the house and yard  work. She is scheduled to return to work on Monday and is concerned with just starting treatment. 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 Currently still taking Protonix, has famotidine to start.   -still smoking, averaging 1/2 pack per day but trying to reduce. Went 4 days last week without a cigarette       Review of Systems:  Patient denies any of the following except if noted above: fevers, chills, difficulty with energy, vision or hearing changes, chest pain, dyspnea, abdominal pain, nausea, vomiting, diarrhea, constipation, urinary concerns, headaches, numbness, tingling, issues with sleep or mood. Also denies lumps, bumps, rashes or skin lesions, bleeding or bruising issues.           Past medical history:  GERD  Bilateral carpal tunnel syndrome    Past surgical history:  Laparoscopic Cholecystectomy (2/2/24)  Laparoscopic Liver resection (seg 4b/5) and portal lymphadenectomy (3/7/24)  Bladder suspension   cervical disc arthroplasty   hysterectomy   wisdom teeth extraction    Social history:   Currently lives independently in [place], [place]. Works at [employer] in [place], worked there for last 5 years. Jeet Smyth lives in [place], [place] (30min away from her) and involved in her care, also has health issues of her own. She also has two neices in [place] who check in on her. Loves to garden. 2 daughters, 4 grandkids,  Former smoker, 45 pack year history, currently smoking 1pack per day.       Family history:  EtOh use disorder in her birth father, birth mother, and brother;   Cancer -  father -throat cacner, uncle had colon cancer, maternal grandmother - throat cancer, and sister - lung cancer, another sister - stomach and colon cancer;   Dementia in her sister.     Allergies:   Allergies   Allergen Reactions    Morphine Nausea and Vomiting    Nicotine Dizziness       Outpatient medications:     Current Outpatient  Medications:     acetaminophen (TYLENOL) 325 MG tablet, Take 3 tablets by mouth 3 times daily, Disp: , Rfl:     albuterol (PROAIR HFA/PROVENTIL HFA/VENTOLIN HFA) 108 (90 Base) MCG/ACT inhaler, Inhale 1-2 puffs into the lungs every 4 hours as needed, Disp: , Rfl:     capecitabine (XELODA) 500 MG tablet, Take 2 tablets (1,000 mg) by mouth 2 times daily for 14 days Days 1 through 14, then off for 7 days.Take with water within 30 minutes after a meal., Disp: 56 tablet, Rfl: 0    cyclobenzaprine (FLEXERIL) 5 MG tablet, Take 5 mg by mouth 2 times daily as needed, Disp: , Rfl:     escitalopram (LEXAPRO) 10 MG tablet, Take 1 tablet by mouth daily at 2 pm, Disp: , Rfl:     famotidine (PEPCID) 20 MG tablet, Take 1 tablet (20 mg) by mouth 2 times daily (Patient not taking: Reported on 4/16/2024), Disp: 60 tablet, Rfl: 3    lidocaine (LIDODERM) 5 % patch, Place onto the skin every 24 hours, Disp: , Rfl:     MULTIPLE VITAMINS-MINERALS PO, Take 1 tablet by mouth daily, Disp: , Rfl:     omeprazole (PRILOSEC) 20 MG DR capsule, Take 20 mg by mouth daily, Disp: , Rfl:     ondansetron (ZOFRAN) 4 MG tablet, Take 4 mg by mouth every 6 hours as needed for nausea or vomiting, Disp: , Rfl:     oxyCODONE (ROXICODONE) 5 MG tablet, Take 5 mg by mouth every 6 hours as needed, Disp: , Rfl:     polyethylene glycol (MIRALAX) 17 GM/Dose powder, Take 17 g by mouth daily, Disp: , Rfl:     prochlorperazine (COMPAZINE) 10 MG tablet, Take 1 tablet (10 mg) by mouth every 6 hours as needed for nausea or vomiting, Disp: 30 tablet, Rfl: 2    senna (SENOKOT) 8.6 MG tablet, Take 8.6 mg by mouth 2 times daily as needed, Disp: , Rfl:     traZODone (DESYREL) 50 MG tablet, Take 50 mg by mouth nightly as needed for sleep, Disp: , Rfl:         Physical Exam:  General: The patient is a pleasant female in no acute distress.  /78   Pulse 90   Temp 99.2  F (37.3  C) (Oral)   Resp 16   Wt 63 kg (138 lb 14.4 oz)   SpO2 97%   BMI 25.00 kg/m    Wt Readings  from Last 10 Encounters:   05/08/24 63 kg (138 lb 14.4 oz)   04/16/24 61.6 kg (135 lb 14.4 oz)   04/01/24 61.2 kg (135 lb)   02/26/24 63.8 kg (140 lb 11.2 oz)   HEENT: EOMI. Sclerae are anicteric. Oral mucosa is pink and moist without lesions or thrush.   Lymph: Neck is supple with no lymphadenopathy in the cervical or supraclavicular areas.   Heart: Regular rate and rhythm.   Lungs: Clear to auscultation bilaterally, normal work of breathing   Abdomen: Bowel sounds present, soft, nontender with no palpable hepatosplenomegaly or masses.   Extremities: No lower extremity edema noted bilaterally.   Neuro: Cranial nerves II through XII are grossly intact.  Skin: No rashes, petechiae, or bruising noted on exposed skin.      Lab data:    Most Recent 3 CBC's:  Recent Labs   Lab Test 05/08/24  0950 04/11/24  1407   WBC 7.6 7.9   HGB 13.5 14.4   MCV 92 92    236   ANEUTAUTO 4.0 3.9     Most Recent 3 BMP's:  Recent Labs   Lab Test 04/11/24  1407      POTASSIUM 3.6   CHLORIDE 102   CO2 25   BUN 13.3   CR 1.17*   ANIONGAP 13   RAMEZ 9.6   GLC 94   PROTTOTAL 7.5   ALBUMIN 4.3    Most Recent 3 LFT's:  Recent Labs   Lab Test 04/11/24  1407   AST 26   ALT 18   ALKPHOS 173*   BILITOTAL 0.4    Most Recent 2 TSH and T4:No lab results found.  I reviewed the above labs today.            Assessment and Plan:    Gallbladder adenocarcinoma.   Ms. Deann Durand is a 63 year old female with prior history of chronic smoking who presented initially with dyspepsia and abdominal pain, underwent cholecystectomy for what was presumed to be symptomatic cholelithiasis and was diagnosed with gallbladder cancer after it was incidentally discovered on histopathological analysis of cholecystectomy specimen. She underwent repeat surgery with resection of liver bed(seg 4b and 5) and portal lymphadenectomy with evidence of clear margins but involvement of portal LN.    She presents today for follow up prior to initiating adjuvant capecitabine  therapy. New baseline CT CAP was obtained 4/11 with no evidence of of metastatic disease within the abdomen or pelvis, mildly prominent soft tissue at the eduard hepatis which may be postsurgical in etiology or a prominent eduard hepatic lymph node, and borderline enlarged gastrohepatic lymph node.    Capecitabine scheduled for delivery on 4/18, she will start cycle 1 on 4/21. Discussed capecitabine schedule and side effects as well as their management including myelosuppression, nausea, vomiting, diarrhea, hand/foot syndrome, mouth sores, and fatigue. We discussed the use of antiemetics, antidiarrheals, skin care, salt/soda swishes, and calling triage with a temperature of 100.4F or higher. Sparkle and Libra deny any further questions today.   -4/11 labs reviewed. Ok to start capecitabine 1000mg BID on 4/21.   -Return to clinic in 3 weeks for labs and REJI follow up prior to cycle 2.   -Plan for repeat imaging and MD follow up in 3 months. Plan for 8 cycles total (~6 months)    Smoking.   Currently working on reducing amount of cigarettes daily, but not interested in cessation right now. Offered resources available, she will let us know if she would like to pursue.       GERD.   Currently taking Protonix daily. Has not started the tapering schedule of alternating famotidine and Protonix in order to discontinue Protonix. Will start alternating tomorrow and discontinue Protonix on 4/21 when starting capecitabine.       The longitudinal plan of care for the diagnosis(es)/condition(s) as documented were addressed during this visit. Due to the added complexity in care, I will continue to support Sparkle in the subsequent management and with ongoing continuity of care.    *** minutes spent on the date of the encounter doing chart review, review of test results, interpretation of tests, patient visit, and documentation     MALIK Varela CNP        [facility]  Medical Oncology Follow Up Note  May 8,  2024      Patient name: Deann Durand  YOB: 1960      Oncologic History:  Diagnosis: Localized, resected (R0) gallbladder cancer (adenocarcinoma), negative (R0) margins, involvement of perimuscular adipose tissue on peritoneal side, poorly differentiated with lymphovascular invasion (LVI) present, 4/4 portal LN involved  Stage of cancer: [id number]     Prior treatment(s):   - 1) laparoscopic cholecystectomy on 2/2/24  - 2) Laparoscopic re-exploration with resection of liver (segment 4b/5) and portal lymphadenectomy on 3/7/24    Current treatment(s):  Adjuvant systemic therapy with oral capecitabine x 6 months (planned)    Treatment intent:  Curative     Care Team  Medical oncologist: Albert Mayers MD ([facility])  Surgical oncologist:  Tacho Harris MD ([facility]) and Silviano Hamilton MD ([facility])  Other members of care team: Dr. Munoz (General Surgery at [facility]) , Domi Darden ([place] [facility]   Primary caregiver at home/ contact:  Libra Beard (daughter) at [phone]    Reason for consultation/ patient visit:  Consideration of adjuvant therapy for resected gallbladder cancer     History of presenting illness:  Ms. Deann Durand is a 63 year old female with recent diagnosis of gallbladder cancer (now resected x 2) who presents to establish medical oncology care. Her oncologic history (as obtained from chart review, patient interview) is summarized below  -    12/23 and 1/24 - presented with chronic, vague abdominal pain and dyspepsia. Diagnosed with symptomatic cholelithiasis.   -2/2/24 : laparoscopic cholecystectomy with Dr. Munoz ([facility]) at [facility]. Pathology showed poorly differentiated adenocarcinoma of gallbladder, 2.4cm in size, negative margins in cystic duct and liver side. pT2a with perimuscular and lymphovascular invasion noted.   -2/13/24: CT CAP - no evidence of metastatic disease.   -2/21/24 : Saw surgeon Dr. Tacho Harris at [facility], recommended repeat  surgery with partial hepatic resection and portal lymphadenectomy.   -2/26/24 : Saw surgeon Dr. Silviano Hamilton at [facility], concurred with above.   -3/07/24 : underwent laparoscopic liver resection (of segment 4b/5) and portal lympadenectomy  -3/25/24 : visit with Med oncology to discuss adjuvant therapy    -4/11/24: new baseline CT CAP demonstrating no evidence of metastatic disease within the abdomen or pelvis, mildly prominent soft tissue at the eduard hepatis which may be postsurgical in etiology or a prominent eduard hepatic lymph node, and borderline enlarged gastrohepatic lymph node.       Sparkle presents today for follow up and consideration of cycle 2 capecitabine.     Interval history:    -finished cycle 1 on Sunday 5/5. Next cycle is scheduled to be delivered and she plans to start cycle 2 on Monday 5/13.  -tolerated cycle 1 well with no noted side effects.   -appetite has been good, eating well. No nausea, abdominal pain, or reflux. She is now taking famotidine daily. Bowel movements at times are looser than normal, but not consistently and no diarrhea.   -has had an intermittent mild headache over the last week in her temples. No vision or neuro changes. No changes in diet, caffeine intake  and drinking adequate fluids. She has not taken anything for the headache. Reports it is mild and comes and goes. No sinus congestion or cough. Has a mild rhinorrhea intermittently. No fever, chills or concerns for infection.   -energy level is good, she continues to be active and is enjoying a lot of yard work with the nice weather       Review of Systems:  Patient denies any of the following except if noted above: fevers, chills, difficulty with energy, vision or hearing changes, chest pain, dyspnea, abdominal pain, nausea, vomiting, diarrhea, constipation, urinary concerns, headaches, numbness, tingling, issues with sleep or mood. Also denies lumps, bumps, rashes or skin lesions, bleeding or bruising issues.           Past  medical history:  GERD  Bilateral carpal tunnel syndrome    Past surgical history:  Laparoscopic Cholecystectomy (2/2/24)  Laparoscopic Liver resection (seg 4b/5) and portal lymphadenectomy (3/7/24)  Bladder suspension   cervical disc arthroplasty   hysterectomy   wisdom teeth extraction    Social history:   Currently lives independently in [place], [place]. Works at [employer] in [place], worked there for last 5 years. Jeet Smyth lives in [place], [place] (30min away from her) and involved in her care, also has health issues of her own. She also has two neices in [place] who check in on her. Loves to garden. 2 daughters, 4 grandkids,  Former smoker, 45 pack year history, currently smoking 1pack per day.       Family history:  EtOh use disorder in her birth father, birth mother, and brother;   Cancer -  father -throat cacner, uncle had colon cancer, maternal grandmother - throat cancer, and sister - lung cancer, another sister - stomach and colon cancer;   Dementia in her sister.     Allergies:   Allergies   Allergen Reactions     Morphine Nausea and Vomiting     Nicotine Dizziness       Outpatient medications:     Current Outpatient Medications:      acetaminophen (TYLENOL) 325 MG tablet, Take 3 tablets by mouth 3 times daily, Disp: , Rfl:      albuterol (PROAIR HFA/PROVENTIL HFA/VENTOLIN HFA) 108 (90 Base) MCG/ACT inhaler, Inhale 1-2 puffs into the lungs every 4 hours as needed, Disp: , Rfl:      capecitabine (XELODA) 500 MG tablet, Take 2 tablets (1,000 mg) by mouth 2 times daily for 14 days Days 1 through 14, then off for 7 days.Take with water within 30 minutes after a meal., Disp: 56 tablet, Rfl: 0     cyclobenzaprine (FLEXERIL) 5 MG tablet, Take 5 mg by mouth 2 times daily as needed, Disp: , Rfl:      escitalopram (LEXAPRO) 10 MG tablet, Take 1 tablet by mouth daily at 2 pm, Disp: , Rfl:      famotidine (PEPCID) 20 MG tablet, Take 1 tablet (20 mg) by mouth 2 times daily (Patient not taking:  Reported on 4/16/2024), Disp: 60 tablet, Rfl: 3     lidocaine (LIDODERM) 5 % patch, Place onto the skin every 24 hours, Disp: , Rfl:      MULTIPLE VITAMINS-MINERALS PO, Take 1 tablet by mouth daily, Disp: , Rfl:      omeprazole (PRILOSEC) 20 MG DR capsule, Take 20 mg by mouth daily, Disp: , Rfl:      ondansetron (ZOFRAN) 4 MG tablet, Take 4 mg by mouth every 6 hours as needed for nausea or vomiting, Disp: , Rfl:      oxyCODONE (ROXICODONE) 5 MG tablet, Take 5 mg by mouth every 6 hours as needed, Disp: , Rfl:      polyethylene glycol (MIRALAX) 17 GM/Dose powder, Take 17 g by mouth daily, Disp: , Rfl:      prochlorperazine (COMPAZINE) 10 MG tablet, Take 1 tablet (10 mg) by mouth every 6 hours as needed for nausea or vomiting, Disp: 30 tablet, Rfl: 2     senna (SENOKOT) 8.6 MG tablet, Take 8.6 mg by mouth 2 times daily as needed, Disp: , Rfl:      traZODone (DESYREL) 50 MG tablet, Take 50 mg by mouth nightly as needed for sleep, Disp: , Rfl:         Physical Exam:  General: The patient is a pleasant female in no acute distress.  /78   Pulse 90   Temp 99.2  F (37.3  C) (Oral)   Resp 16   Wt 63 kg (138 lb 14.4 oz)   SpO2 97%   BMI 25.00 kg/m    Wt Readings from Last 10 Encounters:   05/08/24 63 kg (138 lb 14.4 oz)   04/16/24 61.6 kg (135 lb 14.4 oz)   04/01/24 61.2 kg (135 lb)   02/26/24 63.8 kg (140 lb 11.2 oz)   HEENT: EOMI. Sclerae are anicteric. Oral mucosa is pink and moist without lesions or thrush.   Lymph: Neck is supple with no lymphadenopathy in the cervical or supraclavicular areas.   Heart: Regular rate and rhythm.   Lungs: Clear to auscultation bilaterally, normal work of breathing   Abdomen: Bowel sounds present, soft, nontender with no palpable hepatosplenomegaly or masses.   Extremities: No lower extremity edema noted bilaterally.   Neuro: Cranial nerves II through XII are grossly intact.  Skin: No rashes, petechiae, or bruising noted on exposed skin.      Lab data:    Most Recent 3  CBC's:  Recent Labs   Lab Test 05/08/24  0950 04/11/24  1407   WBC 7.6 7.9   HGB 13.5 14.4   MCV 92 92    236   ANEUTAUTO 4.0 3.9     Most Recent 3 BMP's:  Recent Labs   Lab Test 05/08/24  0950 04/11/24  1407    140   POTASSIUM 4.0 3.6   CHLORIDE 110* 102   CO2 21* 25   BUN 17.0 13.3   CR 0.77 1.17*   ANIONGAP 11 13   RAMEZ 8.9 9.6   * 94   PROTTOTAL 7.1 7.5   ALBUMIN 4.1 4.3    Most Recent 3 LFT's:  Recent Labs   Lab Test 05/08/24  0950 04/11/24  1407   AST 23 26   ALT 11 18   ALKPHOS 156* 173*   BILITOTAL 0.5 0.4    Most Recent 2 TSH and T4:No lab results found.  I reviewed the above labs today.            Assessment and Plan:    Gallbladder adenocarcinoma.   Ms. Deann Durand is a 63 year old female with prior history of chronic smoking who presented initially with dyspepsia and abdominal pain, underwent cholecystectomy for what was presumed to be symptomatic cholelithiasis and was diagnosed with gallbladder cancer after it was incidentally discovered on histopathological analysis of cholecystectomy specimen. She underwent repeat surgery with resection of liver bed(seg 4b and 5) and portal lymphadenectomy with evidence of clear margins but involvement of portal LN.    New baseline CT CAP was obtained 4/11 with no evidence of of metastatic disease within the abdomen or pelvis, mildly prominent soft tissue at the eduard hepatis which may be postsurgical in etiology or a prominent eduard hepatic lymph node, and borderline enlarged gastrohepatic lymph node.    Initiated adjuvant therapy with capecitabine (1000mg BID) on 4/21. Tolerated cycle 1 well with no noted side effects. Today's labs reviewed, proceed with cycle 2 as planned. Return to clinic in 3 weeks with labs and REJI visit prior to cycle 3. Patient to call sooner with any questions or concerns. Plan for repeat imaging and MD follow up in 3 months, plan for 8 cycles total.       Smoking.   Currently working on reducing amount of cigarettes  daily, but not interested in cessation right now. Offered resources available, she will let us know if she would like to pursue.       GERD.   Previously on Protonix daily. Transitioned to famotidine, taking daily with no issues.       The longitudinal plan of care for the diagnosis(es)/condition(s) as documented were addressed during this visit. Due to the added complexity in care, I will continue to support Sparkle in the subsequent management and with ongoing continuity of care.       MALIK Varela CNP          Again, thank you for allowing me to participate in the care of your patient.        Sincerely,        MALIK Varela CNP

## 2024-05-14 ENCOUNTER — TELEPHONE (OUTPATIENT)
Dept: ONCOLOGY | Facility: CLINIC | Age: 64
End: 2024-05-14
Payer: COMMERCIAL

## 2024-05-14 NOTE — TELEPHONE ENCOUNTER
"Oral Chemotherapy Monitoring Program    Subjective/Objective:  Deann \"Sparkle\" FELIPA Durand is a 63 year old female contacted by phone for a follow-up visit for oral chemotherapy. Sparkle confirms taking capecitabine 1000 mg twice daily on days 1-14, and is off for 7 days. She started cycle 2 this morning on 5/14. She notes that she missed a dose during her first cycle due to forgetting to take it, but has not missed any doses for cycle 2. Sparkle denies any new prescription or over the counter medications.    pSarkle reports having 3 loose stools per day which have been manageable and she is not interested in medication therapy for this at this time. Her energy levels have been low but consistent from how she felt during cycle 1. She notes that she tends to stay in bed 1 day per week but is still able to garden. She has been maintaining adequate fluid intake by drinking Powerade and Gatorade. Sparkle denies any symptoms of nausea, vomiting, constipation, abdominal pain mouth sores or pain/discomfort in her hands and feet. Overall, Sparkle feels she has been tolerating treatment well.        4/2/2024    11:00 AM 4/3/2024    10:00 AM 4/29/2024    11:00 AM 5/3/2024    11:00 AM 5/14/2024    12:00 PM   ORAL CHEMOTHERAPY   Assessment Type Initial Work up New Teach Initial Follow up Refill Monthly Follow up   Diagnosis Code Gallbladder Cancer Gallbladder Cancer Gallbladder Cancer Gallbladder Cancer Gallbladder Cancer   Providers Dr. Talib Mayers   [facility]   Is this patient followed by the [facility] team?   No No No   Drug Name Xeloda (capecitabine) Xeloda (capecitabine) Xeloda (capecitabine) Xeloda (capecitabine) Xeloda (capecitabine)   Dose 1,000 mg 1,000 mg 1,000 mg 1,000 mg 1,000 mg   Current Schedule BID BID BID BID BID   Cycle Details 2 weeks on, 1 week off 2 weeks on, 1 week off 2 weeks on, 1 week off 2 weeks on, 1 week off 2 weeks on, 1 week off " "  Start Date of Last Cycle   4/21/2024 5/14/2024   Planned next cycle start date   5/12/2024 6/4/2024   Doses missed in last 2 weeks   1  0   Adherence Assessment   Adherent  Adherent   Adverse Effects   No AE identified during assessment  Fatigue;Diarrhea   Diarrhea     Grade 1   Pharmacist Intervention(diarrhea)     No   Fatigue     Grade 1   Pharmacist Intervention(fatigue)     Yes   Intervention(s)     Patient education   Any new drug interactions?   No  No   Since the last time we talked, have you been hospitalized or used the emergency room?   No  No       Last PHQ-2 Score on record:        No data to display                Vitals:  BP:   BP Readings from Last 1 Encounters:   05/08/24 126/78     Wt Readings from Last 1 Encounters:   05/08/24 63 kg (138 lb 14.4 oz)     Estimated body surface area is 1.67 meters squared as calculated from the following:    Height as of 4/1/24: 1.588 m (5' 2.5\").    Weight as of 5/8/24: 63 kg (138 lb 14.4 oz).    Labs:  _  Result Component Current Result Ref Range   Sodium 142 (5/8/2024) 135 - 145 mmol/L     _  Result Component Current Result Ref Range   Potassium 4.0 (5/8/2024) 3.4 - 5.3 mmol/L     _  Result Component Current Result Ref Range   Calcium 8.9 (5/8/2024) 8.8 - 10.2 mg/dL     No results found for Mag within last 30 days.     No results found for Phos within last 30 days.     _  Result Component Current Result Ref Range   Albumin 4.1 (5/8/2024) 3.5 - 5.2 g/dL     _  Result Component Current Result Ref Range   Urea Nitrogen 17.0 (5/8/2024) 8.0 - 23.0 mg/dL     _  Result Component Current Result Ref Range   Creatinine 0.77 (5/8/2024) 0.51 - 0.95 mg/dL     _  Result Component Current Result Ref Range   AST 23 (5/8/2024) 0 - 45 U/L     _  Result Component Current Result Ref Range   ALT 11 (5/8/2024) 0 - 50 U/L     _  Result Component Current Result Ref Range   Bilirubin Total 0.5 (5/8/2024) <=1.2 mg/dL     _  Result Component Current Result Ref Range   WBC Count 7.6 " (5/8/2024) 4.0 - 11.0 10e3/uL     _  Result Component Current Result Ref Range   Hemoglobin 13.5 (5/8/2024) 11.7 - 15.7 g/dL     _  Result Component Current Result Ref Range   Platelet Count 303 (5/8/2024) 150 - 450 10e3/uL     No results found for ANC within last 30 days.     _  Result Component Current Result Ref Range   Absolute Neutrophils 4.0 (5/8/2024) 1.6 - 8.3 10e3/uL        Assessment/Plan:  Continue capecitabine as planned.    Educated Sparkle to continue light to moderate exercise daily through gardening and ensuring she continues adequate fluid intake. Answered questions to patient satisfaction.    Follow-Up:  Labs and [facility] appointment 5/29    Refill Due:  ~6/4    Whit Storm  Pharmacy Intern  Oral Chemotherapy Monitoring Program  [facility]   [phone]

## 2024-05-20 DIAGNOSIS — C23 MALIGNANT NEOPLASM OF GALLBLADDER (H): Primary | ICD-10-CM

## 2024-05-20 RX ORDER — CAPECITABINE 500 MG/1
1000 TABLET, FILM COATED ORAL 2 TIMES DAILY
Qty: 56 TABLET | Refills: 0 | Status: SHIPPED | OUTPATIENT
Start: 2024-05-27 | End: 2024-08-12

## 2024-05-29 ENCOUNTER — ONCOLOGY VISIT (OUTPATIENT)
Dept: ONCOLOGY | Facility: CLINIC | Age: 64
End: 2024-05-29
Attending: STUDENT IN AN ORGANIZED HEALTH CARE EDUCATION/TRAINING PROGRAM
Payer: COMMERCIAL

## 2024-05-29 ENCOUNTER — APPOINTMENT (OUTPATIENT)
Dept: LAB | Facility: CLINIC | Age: 64
End: 2024-05-29
Attending: STUDENT IN AN ORGANIZED HEALTH CARE EDUCATION/TRAINING PROGRAM
Payer: COMMERCIAL

## 2024-05-29 VITALS
OXYGEN SATURATION: 97 % | BODY MASS INDEX: 24.63 KG/M2 | SYSTOLIC BLOOD PRESSURE: 131 MMHG | RESPIRATION RATE: 16 BRPM | HEIGHT: 63 IN | WEIGHT: 139 LBS | TEMPERATURE: 98.1 F | HEART RATE: 96 BPM | DIASTOLIC BLOOD PRESSURE: 78 MMHG

## 2024-05-29 DIAGNOSIS — C23 MALIGNANT NEOPLASM OF GALLBLADDER (H): ICD-10-CM

## 2024-05-29 LAB
ALBUMIN SERPL BCG-MCNC: 4 G/DL (ref 3.5–5.2)
ALP SERPL-CCNC: 177 U/L (ref 40–150)
ALT SERPL W P-5'-P-CCNC: <5 U/L (ref 0–50)
ANION GAP SERPL CALCULATED.3IONS-SCNC: 11 MMOL/L (ref 7–15)
AST SERPL W P-5'-P-CCNC: 21 U/L (ref 0–45)
BASOPHILS # BLD AUTO: 0 10E3/UL (ref 0–0.2)
BASOPHILS NFR BLD AUTO: 0 %
BILIRUB SERPL-MCNC: 0.3 MG/DL
BUN SERPL-MCNC: 16 MG/DL (ref 8–23)
CALCIUM SERPL-MCNC: 9.1 MG/DL (ref 8.8–10.2)
CHLORIDE SERPL-SCNC: 109 MMOL/L (ref 98–107)
CREAT SERPL-MCNC: 0.58 MG/DL (ref 0.51–0.95)
DEPRECATED HCO3 PLAS-SCNC: 21 MMOL/L (ref 22–29)
EGFRCR SERPLBLD CKD-EPI 2021: >90 ML/MIN/1.73M2
EOSINOPHIL # BLD AUTO: 0.2 10E3/UL (ref 0–0.7)
EOSINOPHIL NFR BLD AUTO: 3 %
ERYTHROCYTE [DISTWIDTH] IN BLOOD BY AUTOMATED COUNT: 14.7 % (ref 10–15)
GLUCOSE SERPL-MCNC: 118 MG/DL (ref 70–99)
HCT VFR BLD AUTO: 41.9 % (ref 35–47)
HGB BLD-MCNC: 13.7 G/DL (ref 11.7–15.7)
IMM GRANULOCYTES # BLD: 0 10E3/UL
IMM GRANULOCYTES NFR BLD: 0 %
LYMPHOCYTES # BLD AUTO: 2.8 10E3/UL (ref 0.8–5.3)
LYMPHOCYTES NFR BLD AUTO: 39 %
MCH RBC QN AUTO: 30.4 PG (ref 26.5–33)
MCHC RBC AUTO-ENTMCNC: 32.7 G/DL (ref 31.5–36.5)
MCV RBC AUTO: 93 FL (ref 78–100)
MONOCYTES # BLD AUTO: 0.7 10E3/UL (ref 0–1.3)
MONOCYTES NFR BLD AUTO: 9 %
NEUTROPHILS # BLD AUTO: 3.6 10E3/UL (ref 1.6–8.3)
NEUTROPHILS NFR BLD AUTO: 49 %
NRBC # BLD AUTO: 0 10E3/UL
NRBC BLD AUTO-RTO: 0 /100
PLATELET # BLD AUTO: 295 10E3/UL (ref 150–450)
POTASSIUM SERPL-SCNC: 3.7 MMOL/L (ref 3.4–5.3)
PROT SERPL-MCNC: 6.9 G/DL (ref 6.4–8.3)
RBC # BLD AUTO: 4.5 10E6/UL (ref 3.8–5.2)
SODIUM SERPL-SCNC: 141 MMOL/L (ref 135–145)
WBC # BLD AUTO: 7.3 10E3/UL (ref 4–11)

## 2024-05-29 PROCEDURE — G2211 COMPLEX E/M VISIT ADD ON: HCPCS

## 2024-05-29 PROCEDURE — 99213 OFFICE O/P EST LOW 20 MIN: CPT

## 2024-05-29 PROCEDURE — 85025 COMPLETE CBC W/AUTO DIFF WBC: CPT

## 2024-05-29 PROCEDURE — 82040 ASSAY OF SERUM ALBUMIN: CPT

## 2024-05-29 PROCEDURE — 36415 COLL VENOUS BLD VENIPUNCTURE: CPT

## 2024-05-29 ASSESSMENT — PAIN SCALES - GENERAL: PAINLEVEL: NO PAIN (0)

## 2024-05-29 NOTE — NURSING NOTE
Chief Complaint   Patient presents with    Labs Only     Labs drawn via vpt by RN in lab, vitals completed and pt was checked in for next appt.     Antoinette Felipe RN

## 2024-05-29 NOTE — Clinical Note
5/29/2024         RE: Deann Durand  [address]  [facility] [address]        Dear Colleague,    Thank you for referring your patient, Deann Durand, to the [facility]. Please see a copy of my visit note below.    [facility]  Medical Oncology Follow Up Note  May 29, 2024      Patient name: Deann Durand  YOB: 1960      Oncologic History:  Diagnosis: Localized, resected (R0) gallbladder cancer (adenocarcinoma), negative (R0) margins, involvement of perimuscular adipose tissue on peritoneal side, poorly differentiated with lymphovascular invasion (LVI) present, 4/4 portal LN involved  Stage of cancer: [id number]     Prior treatment(s):   - 1) laparoscopic cholecystectomy on 2/2/24  - 2) Laparoscopic re-exploration with resection of liver (segment 4b/5) and portal lymphadenectomy on 3/7/24    Current treatment(s):  Adjuvant systemic therapy with oral capecitabine x 6 months (planned)    Treatment intent:  Curative     Care Team  Medical oncologist: Albert Mayers MD ([facility])  Surgical oncologist:  Tacho Harris MD ([facility]) and Silviano Hamilton MD ([facility])  Other members of care team: Dr. Munoz (General Surgery at [facility]) , Domi Darden (PCP) at [facility]   Primary caregiver at home/ contact:  Libra Beard (daughter) at [phone]    Reason for consultation/ patient visit:  Consideration of adjuvant therapy for resected gallbladder cancer     History of presenting illness:  Ms. Deann Durand is a 63 year old female with recent diagnosis of gallbladder cancer (now resected x 2) who presents to establish medical oncology care. Her oncologic history (as obtained from chart review, patient interview) is summarized below  -    12/23 and 1/24 - presented with chronic, vague abdominal pain and dyspepsia. Diagnosed with symptomatic cholelithiasis.   -2/2/24 : laparoscopic cholecystectomy with Dr. Munoz ([facility]) at [facility].  Pathology showed poorly differentiated adenocarcinoma of gallbladder, 2.4cm in size, negative margins in cystic duct and liver side. pT2a with perimuscular and lymphovascular invasion noted.   -2/13/24: CT CAP - no evidence of metastatic disease.   -2/21/24 : Saw surgeon Dr. Tacho Harris at [facility], recommended repeat surgery with partial hepatic resection and portal lymphadenectomy.   -2/26/24 : Saw surgeon Dr. Silviano Hamilton at [facility], concurred with above.   -3/07/24 : underwent laparoscopic liver resection (of segment 4b/5) and portal lympadenectomy  -3/25/24 : visit with Med oncology to discuss adjuvant therapy    -4/11/24: new baseline CT CAP demonstrating no evidence of metastatic disease within the abdomen or pelvis, mildly prominent soft tissue at the eduard hepatis which may be postsurgical in etiology or a prominent eduard hepatic lymph node, and borderline enlarged gastrohepatic lymph node.       Sparkle presents today for follow up and consideration of cycle 2 capecitabine.     Interval history:    Finished cycle 5/27, scheduled to begin  again on 6/3. Scheduled delivery already    Nauseated during off week, last time. Eating wel.  Mild diarrhea during off week. Going 3x/day, loose.   Loose stools off and on during pills.   No nausea during pills.   Couple days of fatigue during off week, been busy in the garden otherwise         ***   -finished cycle 1 on Sunday 5/5. Next cycle is scheduled to be delivered and she plans to start cycle 2 on Monday 5/13.  -tolerated cycle 1 well with no noted side effects.   -appetite has been good, eating well. No nausea, abdominal pain, or reflux. She is now taking famotidine daily. Bowel movements at times are looser than normal, but not consistently and no diarrhea.   -has had an intermittent mild headache over the last week in her temples. No vision or neuro changes. No changes in diet, caffeine intake  and drinking adequate fluids. She has not taken anything for the headache.  Reports it is mild and comes and goes. No sinus congestion or cough. Has a mild rhinorrhea intermittently. No fever, chills or concerns for infection.   -energy level is good, she continues to be active and is enjoying a lot of yard work with the nice weather       Review of Systems:  Patient denies any of the following except if noted above: fevers, chills, difficulty with energy, vision or hearing changes, chest pain, dyspnea, abdominal pain, nausea, vomiting, diarrhea, constipation, urinary concerns, headaches, numbness, tingling, issues with sleep or mood. Also denies lumps, bumps, rashes or skin lesions, bleeding or bruising issues.           Past medical history:  GERD  Bilateral carpal tunnel syndrome    Past surgical history:  Laparoscopic Cholecystectomy (2/2/24)  Laparoscopic Liver resection (seg 4b/5) and portal lymphadenectomy (3/7/24)  Bladder suspension   cervical disc arthroplasty   hysterectomy   wisdom teeth extraction    Social history:   Currently lives independently in [place], [place]. Works at [employer] in [place], worked there for last 5 years. Jeet Smyth lives in [place], [place] (30min away from her) and involved in her care, also has health issues of her own. She also has two neices in [place] who check in on her. Loves to garden. 2 daughters, 4 grandkids,  Former smoker, 45 pack year history, currently smoking 1pack per day.       Family history:  EtOh use disorder in her birth father, birth mother, and brother;   Cancer -  father -throat cacner, uncle had colon cancer, maternal grandmother - throat cancer, and sister - lung cancer, another sister - stomach and colon cancer;   Dementia in her sister.     Allergies:   Allergies   Allergen Reactions    Morphine Nausea and Vomiting    Nicotine Dizziness       Outpatient medications:     Current Outpatient Medications:     acetaminophen (TYLENOL) 325 MG tablet, Take 3 tablets by mouth 3 times daily, Disp: , Rfl:     albuterol  (PROAIR HFA/PROVENTIL HFA/VENTOLIN HFA) 108 (90 Base) MCG/ACT inhaler, Inhale 1-2 puffs into the lungs every 4 hours as needed, Disp: , Rfl:     capecitabine (XELODA) 500 MG tablet, Take 2 tablets (1,000 mg) by mouth 2 times daily for 14 days Days 1 through 14, then off for 7 days.Take with water within 30 minutes after a meal., Disp: 56 tablet, Rfl: 0    cyclobenzaprine (FLEXERIL) 5 MG tablet, Take 5 mg by mouth 2 times daily as needed, Disp: , Rfl:     escitalopram (LEXAPRO) 10 MG tablet, Take 1 tablet by mouth daily at 2 pm, Disp: , Rfl:     famotidine (PEPCID) 20 MG tablet, Take 1 tablet (20 mg) by mouth 2 times daily (Patient not taking: Reported on 4/16/2024), Disp: 60 tablet, Rfl: 3    lidocaine (LIDODERM) 5 % patch, Place onto the skin every 24 hours, Disp: , Rfl:     MULTIPLE VITAMINS-MINERALS PO, Take 1 tablet by mouth daily, Disp: , Rfl:     omeprazole (PRILOSEC) 20 MG DR capsule, Take 20 mg by mouth daily, Disp: , Rfl:     ondansetron (ZOFRAN) 4 MG tablet, Take 4 mg by mouth every 6 hours as needed for nausea or vomiting, Disp: , Rfl:     oxyCODONE (ROXICODONE) 5 MG tablet, Take 5 mg by mouth every 6 hours as needed, Disp: , Rfl:     polyethylene glycol (MIRALAX) 17 GM/Dose powder, Take 17 g by mouth daily, Disp: , Rfl:     prochlorperazine (COMPAZINE) 10 MG tablet, Take 1 tablet (10 mg) by mouth every 6 hours as needed for nausea or vomiting, Disp: 30 tablet, Rfl: 2    senna (SENOKOT) 8.6 MG tablet, Take 8.6 mg by mouth 2 times daily as needed, Disp: , Rfl:     traZODone (DESYREL) 50 MG tablet, Take 50 mg by mouth nightly as needed for sleep, Disp: , Rfl:         Physical Exam:  General: The patient is a pleasant female in no acute distress.  There were no vitals taken for this visit.  Wt Readings from Last 10 Encounters:   05/08/24 63 kg (138 lb 14.4 oz)   04/16/24 61.6 kg (135 lb 14.4 oz)   04/01/24 61.2 kg (135 lb)   02/26/24 63.8 kg (140 lb 11.2 oz)   HEENT: EOMI. Sclerae are anicteric. Oral mucosa  is pink and moist without lesions or thrush.   Lymph: Neck is supple with no lymphadenopathy in the cervical or supraclavicular areas.   Heart: Regular rate and rhythm.   Lungs: Clear to auscultation bilaterally, normal work of breathing   Abdomen: Bowel sounds present, soft, nontender with no palpable hepatosplenomegaly or masses.   Extremities: No lower extremity edema noted bilaterally.   Neuro: Cranial nerves II through XII are grossly intact.  Skin: No rashes, petechiae, or bruising noted on exposed skin.      Lab data:    Most Recent 3 CBC's:  Recent Labs   Lab Test 05/08/24  0950 04/11/24  1407   WBC 7.6 7.9   HGB 13.5 14.4   MCV 92 92    236   ANEUTAUTO 4.0 3.9     Most Recent 3 BMP's:  Recent Labs   Lab Test 05/08/24  0950 04/11/24  1407    140   POTASSIUM 4.0 3.6   CHLORIDE 110* 102   CO2 21* 25   BUN 17.0 13.3   CR 0.77 1.17*   ANIONGAP 11 13   RAMEZ 8.9 9.6   * 94   PROTTOTAL 7.1 7.5   ALBUMIN 4.1 4.3    Most Recent 3 LFT's:  Recent Labs   Lab Test 05/08/24  0950 04/11/24  1407   AST 23 26   ALT 11 18   ALKPHOS 156* 173*   BILITOTAL 0.5 0.4    Most Recent 2 TSH and T4:No lab results found.  I reviewed the above labs today.            Assessment and Plan:    Gallbladder adenocarcinoma.   Ms. Deann Durand is a 63 year old female with prior history of chronic smoking who presented initially with dyspepsia and abdominal pain, underwent cholecystectomy for what was presumed to be symptomatic cholelithiasis and was diagnosed with gallbladder cancer after it was incidentally discovered on histopathological analysis of cholecystectomy specimen. She underwent repeat surgery with resection of liver bed(seg 4b and 5) and portal lymphadenectomy with evidence of clear margins but involvement of portal LN.    New baseline CT CAP was obtained 4/11 with no evidence of of metastatic disease within the abdomen or pelvis, mildly prominent soft tissue at the eduard hepatis which may be postsurgical in  etiology or a prominent eduard hepatic lymph node, and borderline enlarged gastrohepatic lymph node.    Initiated adjuvant therapy with capecitabine (1000mg BID) on 4/21. Tolerated cycle 1 well with no noted side effects. Today's labs reviewed, proceed with cycle 2 as planned. Return to clinic in 3 weeks with labs and REJI visit prior to cycle 3. Patient to call sooner with any questions or concerns. Plan for repeat imaging and MD follow up in 3 months, plan for 8 cycles total.       Smoking.   Currently working on reducing amount of cigarettes daily, but not interested in cessation right now. Offered resources available, she will let us know if she would like to pursue.       GERD.   Previously on Protonix daily. Transitioned to famotidine, taking daily with no issues.       The longitudinal plan of care for the diagnosis(es)/condition(s) as documented were addressed during this visit. Due to the added complexity in care, I will continue to support Sparkle in the subsequent management and with ongoing continuity of care.       *** minutes spent on the date of the encounter doing chart review, review of test results, interpretation of tests, patient visit, and documentation       MALIK Varela CNP        [facility]  Medical Oncology Follow Up Note  May 29, 2024      Patient name: Deann Durand  YOB: 1960      Oncologic History:  Diagnosis: Localized, resected (R0) gallbladder cancer (adenocarcinoma), negative (R0) margins, involvement of perimuscular adipose tissue on peritoneal side, poorly differentiated with lymphovascular invasion (LVI) present, 4/4 portal LN involved  Stage of cancer: [id number]     Prior treatment(s):   - 1) laparoscopic cholecystectomy on 2/2/24  - 2) Laparoscopic re-exploration with resection of liver (segment 4b/5) and portal lymphadenectomy on 3/7/24    Current treatment(s):  Adjuvant systemic therapy with oral capecitabine x 6 months (planned)    Treatment  intent:  Curative     Care Team  Medical oncologist: Albert Mayers MD ([facility])  Surgical oncologist:  Tacho Harris MD ([facility] and Silviano Hamilton MD ([facility])  Other members of care team: Dr. Munoz (General Surgery at [facility]) , Domi Darden (PCP) at [facility]   Primary caregiver at home/ contact:  Libra Beard (daughter) at [phone]    Reason for consultation/ patient visit:  Consideration of adjuvant therapy for resected gallbladder cancer     History of presenting illness:  Ms. Deann Durand is a 63 year old female with recent diagnosis of gallbladder cancer (now resected x 2) who presents to establish medical oncology care. Her oncologic history (as obtained from chart review, patient interview) is summarized below  -    12/23 and 1/24 - presented with chronic, vague abdominal pain and dyspepsia. Diagnosed with symptomatic cholelithiasis.   -2/2/24 : laparoscopic cholecystectomy with Dr. Munoz ([facility]) at [facility]. Pathology showed poorly differentiated adenocarcinoma of gallbladder, 2.4cm in size, negative margins in cystic duct and liver side. pT2a with perimuscular and lymphovascular invasion noted.   -2/13/24: CT CAP - no evidence of metastatic disease.   -2/21/24 : [place]. Tacho [place] [facility] recommended repeat surgery with partial hepatic resection and portal lymphadenectomy.   -2/26/24 : Saw surgeon Dr. Silviano Hamilton at [facility], concurred with above.   -3/07/24 : underwent laparoscopic liver resection (of segment 4b/5) and portal lympadenectomy  -3/25/24 : visit with Med oncology to discuss adjuvant therapy    -4/11/24: new baseline CT CAP demonstrating no evidence of metastatic disease within the abdomen or pelvis, mildly prominent soft tissue at the eduard hepatis which may be postsurgical in etiology or a prominent eduard hepatic lymph node, and borderline enlarged gastrohepatic lymph node.       Sparkle presents today for follow up and consideration of  cycle 3 capecitabine.     Interval history:  She finished capecitabine on 5/27 and is scheduled to begin the next cycle on 6/3. Capecitabine is scheduled to be delivered on Friday 5/31.     Last cycle she had nausea and diarrhea during the off week, up to 3 bowel movements per day. Nausea was not severe enough for her to take PRN antiemetics. Throughout chemo, her bowels are loose. She is eating and drinking well.     She has a couple days of fatigue during the off week, otherwise her energy is good. She has been busy outside with her large garden.     Denies skin concerns. No pain.       Review of Systems:  Patient denies any of the following except if noted above: fevers, chills, difficulty with energy, vision or hearing changes, chest pain, dyspnea, abdominal pain, nausea, vomiting, diarrhea, constipation, urinary concerns, headaches, numbness, tingling, issues with sleep or mood. Also denies lumps, bumps, rashes or skin lesions, bleeding or bruising issues.           Past medical history:  GERD  Bilateral carpal tunnel syndrome    Past surgical history:  Laparoscopic Cholecystectomy (2/2/24)  Laparoscopic Liver resection (seg 4b/5) and portal lymphadenectomy (3/7/24)  Bladder suspension   cervical disc arthroplasty   hysterectomy   wisdom teeth extraction    Social history:   Currently lives independently in [place], [place]. Works at [employer] in [place], worked there for last 5 years. Jeet Smyth lives in [place], [place] (30min away from her) and involved in her care, also has health issues of her own. She also has two neices in [place] who check in on her. Loves to garden. 2 daughters, 4 grandkids,  Former smoker, 45 pack year history, currently smoking 1pack per day.       Family history:  EtOh use disorder in her birth father, birth mother, and brother;   Cancer -  father -throat cacner, uncle had colon cancer, maternal grandmother - throat cancer, and sister - lung cancer, another sister -  stomach and colon cancer;   Dementia in her sister.     Allergies:   Allergies   Allergen Reactions     Morphine Nausea and Vomiting     Nicotine Dizziness       Outpatient medications:     Current Outpatient Medications:      capecitabine (XELODA) 500 MG tablet, Take 2 tablets (1,000 mg) by mouth 2 times daily for 14 days Days 1 through 14, then off for 7 days.Take with water within 30 minutes after a meal., Disp: 56 tablet, Rfl: 0     famotidine (PEPCID) 20 MG tablet, Take 1 tablet (20 mg) by mouth 2 times daily, Disp: 60 tablet, Rfl: 3     acetaminophen (TYLENOL) 325 MG tablet, Take 3 tablets by mouth 3 times daily, Disp: , Rfl:      albuterol (PROAIR HFA/PROVENTIL HFA/VENTOLIN HFA) 108 (90 Base) MCG/ACT inhaler, Inhale 1-2 puffs into the lungs every 4 hours as needed, Disp: , Rfl:      cyclobenzaprine (FLEXERIL) 5 MG tablet, Take 5 mg by mouth 2 times daily as needed (Patient not taking: Reported on 5/29/2024), Disp: , Rfl:      escitalopram (LEXAPRO) 10 MG tablet, Take 1 tablet by mouth daily at 2 pm (Patient not taking: Reported on 5/29/2024), Disp: , Rfl:      lidocaine (LIDODERM) 5 % patch, Place onto the skin every 24 hours (Patient not taking: Reported on 5/29/2024), Disp: , Rfl:      MULTIPLE VITAMINS-MINERALS PO, Take 1 tablet by mouth daily (Patient not taking: Reported on 5/29/2024), Disp: , Rfl:      omeprazole (PRILOSEC) 20 MG DR capsule, Take 20 mg by mouth daily (Patient not taking: Reported on 5/29/2024), Disp: , Rfl:      ondansetron (ZOFRAN) 4 MG tablet, Take 4 mg by mouth every 6 hours as needed for nausea or vomiting (Patient not taking: Reported on 5/29/2024), Disp: , Rfl:      oxyCODONE (ROXICODONE) 5 MG tablet, Take 5 mg by mouth every 6 hours as needed (Patient not taking: Reported on 5/29/2024), Disp: , Rfl:      polyethylene glycol (MIRALAX) 17 GM/Dose powder, Take 17 g by mouth daily (Patient not taking: Reported on 5/29/2024), Disp: , Rfl:      prochlorperazine (COMPAZINE) 10 MG  "tablet, Take 1 tablet (10 mg) by mouth every 6 hours as needed for nausea or vomiting (Patient not taking: Reported on 5/29/2024), Disp: 30 tablet, Rfl: 2     senna (SENOKOT) 8.6 MG tablet, Take 8.6 mg by mouth 2 times daily as needed (Patient not taking: Reported on 5/29/2024), Disp: , Rfl:      traZODone (DESYREL) 50 MG tablet, Take 50 mg by mouth nightly as needed for sleep (Patient not taking: Reported on 5/29/2024), Disp: , Rfl:         Physical Exam:  General: The patient is a pleasant female in no acute distress.  /78 (BP Location: Right arm)   Pulse 96   Temp 98.1  F (36.7  C) (Oral)   Resp 16   Ht 1.588 m (5' 2.52\")   Wt 63 kg (139 lb)   SpO2 97%   BMI 25.00 kg/m    Wt Readings from Last 10 Encounters:   05/29/24 63 kg (139 lb)   05/08/24 63 kg (138 lb 14.4 oz)   04/16/24 61.6 kg (135 lb 14.4 oz)   04/01/24 61.2 kg (135 lb)   02/26/24 63.8 kg (140 lb 11.2 oz)   HEENT: EOMI. Sclerae are anicteric. Oral mucosa is pink and moist without lesions or thrush.   Lymph: Neck is supple with no lymphadenopathy in the cervical or supraclavicular areas.   Heart: Regular rate and rhythm.   Lungs: Clear to auscultation bilaterally, normal work of breathing   Abdomen: Bowel sounds present, soft, nontender with no palpable hepatosplenomegaly or masses.   Extremities: No lower extremity edema noted bilaterally.   Neuro: Cranial nerves II through XII are grossly intact.  Skin: No rashes, petechiae, or bruising noted on exposed skin.      Lab data:    Most Recent 3 CBC's:  Recent Labs   Lab Test 05/29/24  1402 05/08/24  0950 04/11/24  1407   WBC 7.3 7.6 7.9   HGB 13.7 13.5 14.4   MCV 93 92 92    303 236   ANEUTAUTO 3.6 4.0 3.9     Most Recent 3 BMP's:  Recent Labs   Lab Test 05/29/24  1402 05/08/24  0950 04/11/24  1407    142 140   POTASSIUM 3.7 4.0 3.6   CHLORIDE 109* 110* 102   CO2 21* 21* 25   BUN 16.0 17.0 13.3   CR 0.58 0.77 1.17*   ANIONGAP 11 11 13   RAMEZ 9.1 8.9 9.6   * 100* 94 "   PROTTOTAL 6.9 7.1 7.5   ALBUMIN 4.0 4.1 4.3    Most Recent 3 LFT's:  Recent Labs   Lab Test 05/29/24  1402 05/08/24  0950 04/11/24  1407   AST 21 23 26   ALT <5 11 18   ALKPHOS 177* 156* 173*   BILITOTAL 0.3 0.5 0.4    Most Recent 2 TSH and T4:No lab results found.  I reviewed the above labs today.            Assessment and Plan:    Gallbladder adenocarcinoma.   Ms. Deann Durand is a 63 year old female with prior history of chronic smoking who presented initially with dyspepsia and abdominal pain, underwent cholecystectomy for what was presumed to be symptomatic cholelithiasis and was diagnosed with gallbladder cancer after it was incidentally discovered on histopathological analysis of cholecystectomy specimen. She underwent repeat surgery with resection of liver bed(seg 4b and 5) and portal lymphadenectomy with evidence of clear margins but involvement of portal LN.    New baseline CT CAP was obtained 4/11 with no evidence of of metastatic disease within the abdomen or pelvis, mildly prominent soft tissue at the eduard hepatis which may be postsurgical in etiology or a prominent eduard hepatic lymph node, and borderline enlarged gastrohepatic lymph node.    Initiated adjuvant therapy with capecitabine (1000mg BID) on 4/21. Tolerating treatment well with mild diarrhea, nausea and fatigue. Today's labs reviewed without concern, proceed with cycle 3 as scheduled on 6/3. She will go to [place] for labs prior to next cycle. REJI follow up prior to cycle 6 scheduled 7/10. Patient to call sooner with any concerns.  Plan for repeat imaging and MD follow up in 3 months, plan for 8 cycles total.       Smoking.   Currently working on reducing amount of cigarettes daily, but not interested in cessation right now. Offered resources available, she will let us know if she would like to pursue.       GERD.   Previously on Protonix daily. Transitioned to famotidine, taking daily with no issues.       The longitudinal plan of  care for the diagnosis(es)/condition(s) as documented were addressed during this visit. Due to the added complexity in care, I will continue to support Sparkle in the subsequent management and with ongoing continuity of care.      MALIK Varela CNP          Again, thank you for allowing me to participate in the care of your patient.        Sincerely,        MALIK Varela CNP

## 2024-05-29 NOTE — PROGRESS NOTES
[facility]  Medical Oncology Follow Up Note  May 29, 2024      Patient name: Deann Durand  YOB: 1960      Oncologic History:  Diagnosis: Localized, resected (R0) gallbladder cancer (adenocarcinoma), negative (R0) margins, involvement of perimuscular adipose tissue on peritoneal side, poorly differentiated with lymphovascular invasion (LVI) present, 4/4 portal LN involved  Stage of cancer: [id number]     Prior treatment(s):   - 1) laparoscopic cholecystectomy on 2/2/24  - 2) Laparoscopic re-exploration with resection of liver (segment 4b/5) and portal lymphadenectomy on 3/7/24    Current treatment(s):  Adjuvant systemic therapy with oral capecitabine x 6 months (planned)    Treatment intent:  Curative     Care Team  Medical oncologist: Albert Mayers MD ([facility])  Surgical oncologist:  Tacho Harris MD ([facility]) and Silviano Hamilton MD ([facility])  Other members of care team: Dr. Munoz (General Surgery at [facility]) , Domi Darden (PCP) at [facility]   Primary caregiver at home/ contact:  Libra Beard (daughter) at [phone]    Reason for consultation/ patient visit:  Consideration of adjuvant therapy for resected gallbladder cancer     History of presenting illness:  Ms. Deann Durand is a 63 year old female with recent diagnosis of gallbladder cancer (now resected x 2) who presents to establish medical oncology care. Her oncologic history (as obtained from chart review, patient interview) is summarized below  -    12/23 and 1/24 - presented with chronic, vague abdominal pain and dyspepsia. Diagnosed with symptomatic cholelithiasis.   -2/2/24 : [facility] at [facility]. Pathology showed poorly differentiated adenocarcinoma of gallbladder, 2.4cm in size, negative margins in cystic duct and liver side. pT2a with perimuscular and lymphovascular invasion noted.   -2/13/24: CT CAP - no evidence of metastatic disease.    -2/21/24 : Saw surgeon Dr. Tacho Harris at [facility], recommended repeat surgery with partial hepatic resection and portal lymphadenectomy.   -2/26/24 : Saw surgeon Dr. Silviano Hamilton at [facility], concurred with above.   -3/07/24 : underwent laparoscopic liver resection (of segment 4b/5) and portal lympadenectomy  -3/25/24 : visit with Med oncology to discuss adjuvant therapy    -4/11/24: new baseline CT CAP demonstrating no evidence of metastatic disease within the abdomen or pelvis, mildly prominent soft tissue at the eduard hepatis which may be postsurgical in etiology or a prominent eduard hepatic lymph node, and borderline enlarged gastrohepatic lymph node.       Sparkle presents today for follow up and consideration of cycle 3 capecitabine.     Interval history:  She finished capecitabine on 5/27 and is scheduled to begin the next cycle on 6/3. Capecitabine is scheduled to be delivered on Friday 5/31.     Last cycle she had nausea and diarrhea during the off week, up to 3 bowel movements per day. Nausea was not severe enough for her to take PRN antiemetics. Throughout chemo, her bowels are loose. She is eating and drinking well.     She has a couple days of fatigue during the off week, otherwise her energy is good. She has been busy outside with her large garden.     Denies skin concerns. No pain.       Review of Systems:  Patient denies any of the following except if noted above: fevers, chills, difficulty with energy, vision or hearing changes, chest pain, dyspnea, abdominal pain, nausea, vomiting, diarrhea, constipation, urinary concerns, headaches, numbness, tingling, issues with sleep or mood. Also denies lumps, bumps, rashes or skin lesions, bleeding or bruising issues.           Past medical history:  GERD  Bilateral carpal tunnel syndrome    Past surgical history:  Laparoscopic Cholecystectomy (2/2/24)  Laparoscopic Liver resection (seg 4b/5) and portal lymphadenectomy (3/7/24)  Bladder suspension   cervical disc  arthroplasty   hysterectomy   wisdom teeth extraction    Social history:   Currently lives independently in [place], [place]. Works at "[employer] in [place], worked there for last 5 years. Jeet Smyth lives in [place], [place] (30min away from her) and involved in her care, also has health issues of her own. She also has two neices in [place] who check in on her. Loves to garden. 2 daughters, 4 grandkids,  Former smoker, 45 pack year history, currently smoking 1pack per day.       Family history:  EtOh use disorder in her birth father, birth mother, and brother;   Cancer -  father -throat cacner, uncle had colon cancer, maternal grandmother - throat cancer, and sister - lung cancer, another sister - stomach and colon cancer;   Dementia in her sister.     Allergies:   Allergies   Allergen Reactions    Morphine Nausea and Vomiting    Nicotine Dizziness       Outpatient medications:     Current Outpatient Medications:     capecitabine (XELODA) 500 MG tablet, Take 2 tablets (1,000 mg) by mouth 2 times daily for 14 days Days 1 through 14, then off for 7 days.Take with water within 30 minutes after a meal., Disp: 56 tablet, Rfl: 0    famotidine (PEPCID) 20 MG tablet, Take 1 tablet (20 mg) by mouth 2 times daily, Disp: 60 tablet, Rfl: 3    acetaminophen (TYLENOL) 325 MG tablet, Take 3 tablets by mouth 3 times daily, Disp: , Rfl:     albuterol (PROAIR HFA/PROVENTIL HFA/VENTOLIN HFA) 108 (90 Base) MCG/ACT inhaler, Inhale 1-2 puffs into the lungs every 4 hours as needed, Disp: , Rfl:     cyclobenzaprine (FLEXERIL) 5 MG tablet, Take 5 mg by mouth 2 times daily as needed (Patient not taking: Reported on 5/29/2024), Disp: , Rfl:     escitalopram (LEXAPRO) 10 MG tablet, Take 1 tablet by mouth daily at 2 pm (Patient not taking: Reported on 5/29/2024), Disp: , Rfl:     lidocaine (LIDODERM) 5 % patch, Place onto the skin every 24 hours (Patient not taking: Reported on 5/29/2024), Disp: , Rfl:     MULTIPLE VITAMINS-MINERALS  "PO, Take 1 tablet by mouth daily (Patient not taking: Reported on 5/29/2024), Disp: , Rfl:     omeprazole (PRILOSEC) 20 MG DR capsule, Take 20 mg by mouth daily (Patient not taking: Reported on 5/29/2024), Disp: , Rfl:     ondansetron (ZOFRAN) 4 MG tablet, Take 4 mg by mouth every 6 hours as needed for nausea or vomiting (Patient not taking: Reported on 5/29/2024), Disp: , Rfl:     oxyCODONE (ROXICODONE) 5 MG tablet, Take 5 mg by mouth every 6 hours as needed (Patient not taking: Reported on 5/29/2024), Disp: , Rfl:     polyethylene glycol (MIRALAX) 17 GM/Dose powder, Take 17 g by mouth daily (Patient not taking: Reported on 5/29/2024), Disp: , Rfl:     prochlorperazine (COMPAZINE) 10 MG tablet, Take 1 tablet (10 mg) by mouth every 6 hours as needed for nausea or vomiting (Patient not taking: Reported on 5/29/2024), Disp: 30 tablet, Rfl: 2    senna (SENOKOT) 8.6 MG tablet, Take 8.6 mg by mouth 2 times daily as needed (Patient not taking: Reported on 5/29/2024), Disp: , Rfl:     traZODone (DESYREL) 50 MG tablet, Take 50 mg by mouth nightly as needed for sleep (Patient not taking: Reported on 5/29/2024), Disp: , Rfl:         Physical Exam:  General: The patient is a pleasant female in no acute distress.  /78 (BP Location: Right arm)   Pulse 96   Temp 98.1  F (36.7  C) (Oral)   Resp 16   Ht 1.588 m (5' 2.52\")   Wt 63 kg (139 lb)   SpO2 97%   BMI 25.00 kg/m    Wt Readings from Last 10 Encounters:   05/29/24 63 kg (139 lb)   05/08/24 63 kg (138 lb 14.4 oz)   04/16/24 61.6 kg (135 lb 14.4 oz)   04/01/24 61.2 kg (135 lb)   02/26/24 63.8 kg (140 lb 11.2 oz)   HEENT: EOMI. Sclerae are anicteric. Oral mucosa is pink and moist without lesions or thrush.   Lymph: Neck is supple with no lymphadenopathy in the cervical or supraclavicular areas.   Heart: Regular rate and rhythm.   Lungs: Clear to auscultation bilaterally, normal work of breathing   Abdomen: Bowel sounds present, soft, nontender with no palpable " hepatosplenomegaly or masses.   Extremities: No lower extremity edema noted bilaterally.   Neuro: Cranial nerves II through XII are grossly intact.  Skin: No rashes, petechiae, or bruising noted on exposed skin.      Lab data:    Most Recent 3 CBC's:  Recent Labs   Lab Test 05/29/24  1402 05/08/24  0950 04/11/24  1407   WBC 7.3 7.6 7.9   HGB 13.7 13.5 14.4   MCV 93 92 92    303 236   ANEUTAUTO 3.6 4.0 3.9     Most Recent 3 BMP's:  Recent Labs   Lab Test 05/29/24  1402 05/08/24  0950 04/11/24  1407    142 140   POTASSIUM 3.7 4.0 3.6   CHLORIDE 109* 110* 102   CO2 21* 21* 25   BUN 16.0 17.0 13.3   CR 0.58 0.77 1.17*   ANIONGAP 11 11 13   RAMEZ 9.1 8.9 9.6   * 100* 94   PROTTOTAL 6.9 7.1 7.5   ALBUMIN 4.0 4.1 4.3    Most Recent 3 LFT's:  Recent Labs   Lab Test 05/29/24  1402 05/08/24  0950 04/11/24  1407   AST 21 23 26   ALT <5 11 18   ALKPHOS 177* 156* 173*   BILITOTAL 0.3 0.5 0.4    Most Recent 2 TSH and T4:No lab results found.  I reviewed the above labs today.            Assessment and Plan:    Gallbladder adenocarcinoma.   Ms. Deann Durand is a 63 year old female with prior history of chronic smoking who presented initially with dyspepsia and abdominal pain, underwent cholecystectomy for what was presumed to be symptomatic cholelithiasis and was diagnosed with gallbladder cancer after it was incidentally discovered on histopathological analysis of cholecystectomy specimen. She underwent repeat surgery with resection of liver bed(seg 4b and 5) and portal lymphadenectomy with evidence of clear margins but involvement of portal LN.    New baseline CT CAP was obtained 4/11 with no evidence of of metastatic disease within the abdomen or pelvis, mildly prominent soft tissue at the eduard hepatis which may be postsurgical in etiology or a prominent eduard hepatic lymph node, and borderline enlarged gastrohepatic lymph node.    Initiated adjuvant therapy with capecitabine (1000mg BID) on 4/21. Tolerating  treatment well with mild diarrhea, nausea and fatigue. Today's labs reviewed without concern, proceed with cycle 3 as scheduled on 6/3. She will go to [place] for labs prior to next cycle. REJI follow up prior to cycle 6 scheduled 7/10. Patient to call sooner with any concerns.  Plan for repeat imaging and MD follow up in 3 months, plan for 8 cycles total.       Smoking.   Currently working on reducing amount of cigarettes daily, but not interested in cessation right now. Offered resources available, she will let us know if she would like to pursue.       GERD.   Previously on Protonix daily. Transitioned to famotidine, taking daily with no issues.       The longitudinal plan of care for the diagnosis(es)/condition(s) as documented were addressed during this visit. Due to the added complexity in care, I will continue to support Sparkle in the subsequent management and with ongoing continuity of care.      MALIK Varela CNP

## 2024-05-29 NOTE — NURSING NOTE
"Oncology Rooming Note    May 29, 2024 2:29 PM   Deann Durand is a 63 year old female who presents for:    Chief Complaint   Patient presents with    Labs Only     Labs drawn via vpt by RN in lab, vitals completed and pt was checked in for next appt.    Oncology Clinic Visit     [facility] RETURN - MALIGNANT NEOPLASM OF GALLBLADDER      Initial Vitals: /78 (BP Location: Right arm)   Pulse 96   Temp 98.1  F (36.7  C) (Oral)   Resp 16   Ht 1.588 m (5' 2.52\")   Wt 63 kg (139 lb)   SpO2 97%   BMI 25.00 kg/m   Estimated body mass index is 25 kg/m  as calculated from the following:    Height as of this encounter: 1.588 m (5' 2.52\").    Weight as of this encounter: 63 kg (139 lb). Body surface area is 1.67 meters squared.  No Pain (0) Comment: Data Unavailable   No LMP recorded. (Menstrual status: UNKNOWN).  Allergies reviewed: Yes  Medications reviewed: Yes    Medications: Medication refills not needed today.  Pharmacy name entered into [facility]:    [id number] - [place], [place] - [address], [place] - [address] [place] [address]    Frailty Screening:   Is the patient here for a new oncology consult visit in cancer care? 2. No    Dane Styles LPN              "

## 2024-06-09 DIAGNOSIS — C22.1 CHOLANGIOCARCINOMA (H): Primary | ICD-10-CM

## 2024-06-14 DIAGNOSIS — C23 MALIGNANT NEOPLASM OF GALLBLADDER (H): Primary | ICD-10-CM

## 2024-06-14 RX ORDER — CAPECITABINE 500 MG/1
1000 TABLET, FILM COATED ORAL 2 TIMES DAILY
Qty: 56 TABLET | Refills: 0 | Status: SHIPPED | OUTPATIENT
Start: 2024-06-17 | End: 2024-07-01

## 2024-06-19 ENCOUNTER — TELEPHONE (OUTPATIENT)
Dept: ONCOLOGY | Facility: CLINIC | Age: 64
End: 2024-06-19
Payer: COMMERCIAL

## 2024-06-19 NOTE — TELEPHONE ENCOUNTER
Oral Chemotherapy Monitoring Program    Placed call to patient in follow up of Xeloda therapy.    Left message to please call back in follow up of therapy. No patient or drug names were mentioned.    Francisca Coleman  Pharmacy Intern  Oral Chemotherapy Monitoring Program  [facility]  [phone]

## 2024-06-20 ENCOUNTER — TELEPHONE (OUTPATIENT)
Dept: ONCOLOGY | Facility: CLINIC | Age: 64
End: 2024-06-20
Payer: COMMERCIAL

## 2024-06-20 NOTE — ORAL ONC MGMT
"Oral Chemotherapy Monitoring Program    Subjective/Objective:  Deann Durand is a 63 year old female contacted by phone for a follow-up visit for oral chemotherapy. Sparkle called in for a routine assessment of the capecitabine. This past week she has noticed some neuropathy type symptoms in her right hand only - no tingling or skin issues, but she has been dropping more things and sometimes she thinks she is still holding on to something but doesn't realize she's already dropped it, and can't quite close her hand all the way, thinks the 4th finger is bent a little bit. She has about 1 episode of diarrhea towards the end of the cycle, but does not need loperamide and just \"eats some cheese\" and it resolves.     She missed 2 days of treatment this cycle due to forgetting during her garage sale so current cycle just ended this morning. She will plan to get walk in labs early next week before starting the next cycle potentially on Thursday.        4/29/2024    11:00 AM 5/3/2024    11:00 AM 5/14/2024    12:00 PM 5/20/2024     8:00 AM 6/14/2024    10:00 AM 6/19/2024     3:00 PM 6/20/2024     8:00 AM   ORAL CHEMOTHERAPY   Assessment Type Initial Follow up Refill Monthly Follow up Refill Refill Monthly Follow up    Diagnosis Code Gallbladder Cancer Gallbladder Cancer Gallbladder Cancer Gallbladder Cancer Gallbladder Cancer Gallbladder Cancer Gallbladder Cancer   Providers Dr. Talib Mayers   [facility]   Is this patient followed by the [facility] OC team? No No No No No No    Drug Name Xeloda (capecitabine) Xeloda (capecitabine) Xeloda (capecitabine) Xeloda (capecitabine) Xeloda (capecitabine) Xeloda (capecitabine) Xeloda (capecitabine)   Dose 1,000 mg 1,000 mg 1,000 mg 1,000 mg 1,000 mg 1,000 mg 1,000 mg   Current Schedule BID BID BID BID BID BID BID   Cycle Details 2 weeks on, 1 week off 2 weeks on, 1 week off 2 " "weeks on, 1 week off 2 weeks on, 1 week off 2 weeks on, 1 week off 2 weeks on, 1 week off 2 weeks on, 1 week off   Start Date of Last Cycle 4/21/2024  5/14/2024  6/3/2024     Planned next cycle start date 5/12/2024 6/4/2024 6/4/2024 6/24/2024     Doses missed in last 2 weeks 1  0       Adherence Assessment Adherent  Adherent       Adverse Effects No AE identified during assessment  Fatigue;Diarrhea       Diarrhea   Grade 1       Pharmacist Intervention(diarrhea)   No       Fatigue   Grade 1       Pharmacist Intervention(fatigue)   Yes       Intervention(s)   Patient education       Any new drug interactions? No  No       Since the last time we talked, have you been hospitalized or used the emergency room? No  No           Last PHQ-2 Score on record:        No data to display                Vitals:  BP:   BP Readings from Last 1 Encounters:   05/29/24 131/78     Wt Readings from Last 1 Encounters:   05/29/24 63 kg (139 lb)     Estimated body surface area is 1.67 meters squared as calculated from the following:    Height as of 5/29/24: 1.588 m (5' 2.52\").    Weight as of 5/29/24: 63 kg (139 lb).    Labs:  _  Result Component Current Result Ref Range   Sodium 141 (5/29/2024) 135 - 145 mmol/L     _  Result Component Current Result Ref Range   Potassium 3.7 (5/29/2024) 3.4 - 5.3 mmol/L     _  Result Component Current Result Ref Range   Calcium 9.1 (5/29/2024) 8.8 - 10.2 mg/dL     No results found for Mag within last 30 days.     No results found for Phos within last 30 days.     _  Result Component Current Result Ref Range   Albumin 4.0 (5/29/2024) 3.5 - 5.2 g/dL     _  Result Component Current Result Ref Range   Urea Nitrogen 16.0 (5/29/2024) 8.0 - 23.0 mg/dL     _  Result Component Current Result Ref Range   Creatinine 0.58 (5/29/2024) 0.51 - 0.95 mg/dL     _  Result Component Current Result Ref Range   AST 21 (5/29/2024) 0 - 45 U/L     _  Result Component Current Result Ref Range   ALT <5 (5/29/2024) 0 - 50 U/L "     _  Result Component Current Result Ref Range   Bilirubin Total 0.3 (5/29/2024) <=1.2 mg/dL     _  Result Component Current Result Ref Range   WBC Count 7.3 (5/29/2024) 4.0 - 11.0 10e3/uL     _  Result Component Current Result Ref Range   Hemoglobin 13.7 (5/29/2024) 11.7 - 15.7 g/dL     _  Result Component Current Result Ref Range   Platelet Count 295 (5/29/2024) 150 - 450 10e3/uL     No results found for ANC within last 30 days.     _  Result Component Current Result Ref Range   Absolute Neutrophils 3.6 (5/29/2024) 1.6 - 8.3 10e3/uL          Assessment/Plan:  Walk in labs next week, potentially start next cycle on 6/27, IB to care team if does adjustment is desired now or waiting until 7/10 appt     Follow-Up:  Walk in labs next week  7/10 Labs and Visit with Kori Malloy, PharmD, BCPS  Oral Chemotherapy Monitoring Program  [facility]  [phone]

## 2024-06-24 ENCOUNTER — LAB (OUTPATIENT)
Dept: LAB | Facility: HOSPITAL | Age: 64
End: 2024-06-24
Payer: COMMERCIAL

## 2024-06-24 DIAGNOSIS — C23 MALIGNANT NEOPLASM OF GALLBLADDER (H): ICD-10-CM

## 2024-06-24 LAB
ALBUMIN SERPL BCG-MCNC: 4.1 G/DL (ref 3.5–5.2)
ALP SERPL-CCNC: 285 U/L (ref 40–150)
ALT SERPL W P-5'-P-CCNC: 17 U/L (ref 0–50)
ANION GAP SERPL CALCULATED.3IONS-SCNC: 14 MMOL/L (ref 7–15)
AST SERPL W P-5'-P-CCNC: 28 U/L (ref 0–45)
BASOPHILS # BLD AUTO: 0.1 10E3/UL (ref 0–0.2)
BASOPHILS NFR BLD AUTO: 1 %
BILIRUB SERPL-MCNC: 1 MG/DL
BUN SERPL-MCNC: 15 MG/DL (ref 8–23)
CALCIUM SERPL-MCNC: 9 MG/DL (ref 8.8–10.2)
CHLORIDE SERPL-SCNC: 105 MMOL/L (ref 98–107)
CREAT SERPL-MCNC: 0.69 MG/DL (ref 0.51–0.95)
DEPRECATED HCO3 PLAS-SCNC: 21 MMOL/L (ref 22–29)
EGFRCR SERPLBLD CKD-EPI 2021: >90 ML/MIN/1.73M2
EOSINOPHIL # BLD AUTO: 0.5 10E3/UL (ref 0–0.7)
EOSINOPHIL NFR BLD AUTO: 5 %
ERYTHROCYTE [DISTWIDTH] IN BLOOD BY AUTOMATED COUNT: 16.3 % (ref 10–15)
GLUCOSE SERPL-MCNC: 131 MG/DL (ref 70–99)
HCT VFR BLD AUTO: 39 % (ref 35–47)
HGB BLD-MCNC: 13.3 G/DL (ref 11.7–15.7)
IMM GRANULOCYTES # BLD: 0 10E3/UL
IMM GRANULOCYTES NFR BLD: 0 %
LYMPHOCYTES # BLD AUTO: 3.2 10E3/UL (ref 0.8–5.3)
LYMPHOCYTES NFR BLD AUTO: 35 %
MCH RBC QN AUTO: 31.4 PG (ref 26.5–33)
MCHC RBC AUTO-ENTMCNC: 34.1 G/DL (ref 31.5–36.5)
MCV RBC AUTO: 92 FL (ref 78–100)
MONOCYTES # BLD AUTO: 0.9 10E3/UL (ref 0–1.3)
MONOCYTES NFR BLD AUTO: 10 %
NEUTROPHILS # BLD AUTO: 4.4 10E3/UL (ref 1.6–8.3)
NEUTROPHILS NFR BLD AUTO: 49 %
NRBC # BLD AUTO: 0 10E3/UL
NRBC BLD AUTO-RTO: 0 /100
PLATELET # BLD AUTO: 267 10E3/UL (ref 150–450)
POTASSIUM SERPL-SCNC: 3.4 MMOL/L (ref 3.4–5.3)
PROT SERPL-MCNC: 7.2 G/DL (ref 6.4–8.3)
RBC # BLD AUTO: 4.23 10E6/UL (ref 3.8–5.2)
SODIUM SERPL-SCNC: 140 MMOL/L (ref 135–145)
WBC # BLD AUTO: 9.1 10E3/UL (ref 4–11)

## 2024-06-24 PROCEDURE — 84450 TRANSFERASE (AST) (SGOT): CPT

## 2024-06-24 PROCEDURE — 84155 ASSAY OF PROTEIN SERUM: CPT

## 2024-06-24 PROCEDURE — 85004 AUTOMATED DIFF WBC COUNT: CPT

## 2024-06-24 PROCEDURE — 36415 COLL VENOUS BLD VENIPUNCTURE: CPT

## 2024-06-25 ENCOUNTER — TELEPHONE (OUTPATIENT)
Dept: ONCOLOGY | Facility: CLINIC | Age: 64
End: 2024-06-25
Payer: COMMERCIAL

## 2024-06-25 NOTE — TELEPHONE ENCOUNTER
Oral Chemotherapy Monitoring Program  Lab Follow Up    Reviewed lab results from 6/24.        4/29/2024    11:00 AM 5/3/2024    11:00 AM 5/14/2024    12:00 PM 5/20/2024     8:00 AM 6/14/2024    10:00 AM 6/19/2024     3:00 PM 6/20/2024     8:00 AM   ORAL CHEMOTHERAPY   Assessment Type Initial Follow up Refill Monthly Follow up Refill Refill Monthly Follow up Monthly Follow up   Diagnosis Code Gallbladder Cancer Gallbladder Cancer Gallbladder Cancer Gallbladder Cancer Gallbladder Cancer Gallbladder Cancer Gallbladder Cancer   Providers Dr. Talib Mayers   [facility]   Is this patient followed by the [facility] OC team? No No No No No No    Drug Name Xeloda (capecitabine) Xeloda (capecitabine) Xeloda (capecitabine) Xeloda (capecitabine) Xeloda (capecitabine) Xeloda (capecitabine) Xeloda (capecitabine)   Dose 1,000 mg 1,000 mg 1,000 mg 1,000 mg 1,000 mg 1,000 mg 1,000 mg   Current Schedule BID BID BID BID BID BID BID   Cycle Details 2 weeks on, 1 week off 2 weeks on, 1 week off 2 weeks on, 1 week off 2 weeks on, 1 week off 2 weeks on, 1 week off 2 weeks on, 1 week off 2 weeks on, 1 week off   Start Date of Last Cycle 4/21/2024  5/14/2024  6/3/2024  6/3/2024   Planned next cycle start date 5/12/2024 6/4/2024 6/4/2024 6/24/2024 6/27/2024   Doses missed in last 2 weeks 1  0    4   Adherence Assessment Adherent  Adherent    Non-adherent   Reason for Non-adherence       Patient forgets   Adverse Effects No AE identified during assessment  Fatigue;Diarrhea    Other (See Note for Details);Diarrhea   Diarrhea   Grade 1    Grade 1   Pharmacist Intervention(diarrhea)   No    No   Fatigue   Grade 1       Pharmacist Intervention(fatigue)   Yes       Intervention(s)   Patient education       Pharmacist intervention(other)       Yes   Intervention(s)       Referral to oncology provider   Any new drug interactions? No  No        Since the last time we talked, have you been hospitalized or used the emergency room? No  No           Labs:  _  Result Component Current Result Ref Range   Sodium 140 (6/24/2024) 135 - 145 mmol/L     _  Result Component Current Result Ref Range   Potassium 3.4 (6/24/2024) 3.4 - 5.3 mmol/L     _  Result Component Current Result Ref Range   Calcium 9.0 (6/24/2024) 8.8 - 10.2 mg/dL     No results found for Mag within last 30 days.     No results found for Phos within last 30 days.     _  Result Component Current Result Ref Range   Albumin 4.1 (6/24/2024) 3.5 - 5.2 g/dL     _  Result Component Current Result Ref Range   Urea Nitrogen 15.0 (6/24/2024) 8.0 - 23.0 mg/dL     _  Result Component Current Result Ref Range   Creatinine 0.69 (6/24/2024) 0.51 - 0.95 mg/dL     _  Result Component Current Result Ref Range   AST 28 (6/24/2024) 0 - 45 U/L     _  Result Component Current Result Ref Range   ALT 17 (6/24/2024) 0 - 50 U/L     _  Result Component Current Result Ref Range   Bilirubin Total 1.0 (6/24/2024) <=1.2 mg/dL     _  Result Component Current Result Ref Range   WBC Count 9.1 (6/24/2024) 4.0 - 11.0 10e3/uL     _  Result Component Current Result Ref Range   Hemoglobin 13.3 (6/24/2024) 11.7 - 15.7 g/dL     _  Result Component Current Result Ref Range   Platelet Count 267 (6/24/2024) 150 - 450 10e3/uL     No results found for ANC within last 30 days.     _  Result Component Current Result Ref Range   Absolute Neutrophils 4.4 (6/24/2024) 1.6 - 8.3 10e3/uL        Assessment & Plan:  No clinically concerning abnormalities. Sparkle will start her next cycle on 6/27.    Questions answered to patient's satisfaction.    Follow-Up:  Labs/office visit on 7/10. Sent message to Kori Guaman to ask if she wants to move these appointments closer to next cycle start (due 7/18).    Nayeli Dickey, PharmD, BCOP  Oral Chemotherapy Monitoring Program  [facility]  [phone]

## 2024-07-01 ENCOUNTER — TELEPHONE (OUTPATIENT)
Dept: ONCOLOGY | Facility: CLINIC | Age: 64
End: 2024-07-01
Payer: COMMERCIAL

## 2024-07-01 NOTE — TELEPHONE ENCOUNTER
STD forms received via [employer]ax from [employer].      Forms to be completed and put in folder for provider to approve.    Fax #:  [phone]  Claim: [id number]    Shruthi Cruz

## 2024-07-05 DIAGNOSIS — C23 MALIGNANT NEOPLASM OF GALLBLADDER (H): Primary | ICD-10-CM

## 2024-07-08 DIAGNOSIS — C23 MALIGNANT NEOPLASM OF GALLBLADDER (H): Primary | ICD-10-CM

## 2024-07-08 RX ORDER — CAPECITABINE 500 MG/1
1000 TABLET, FILM COATED ORAL 2 TIMES DAILY
Qty: 56 TABLET | Refills: 0 | Status: SHIPPED | OUTPATIENT
Start: 2024-07-08 | End: 2024-08-12

## 2024-07-10 NOTE — TELEPHONE ENCOUNTER
STD paperwork completed, checked for accuracy, signed and emailed to [email or web address] @ [email or web address]. A copy was made, sent to scanning and original mailed to patient at home address.    Danelle Harris

## 2024-07-15 ENCOUNTER — APPOINTMENT (OUTPATIENT)
Dept: LAB | Facility: CLINIC | Age: 64
End: 2024-07-15
Attending: STUDENT IN AN ORGANIZED HEALTH CARE EDUCATION/TRAINING PROGRAM
Payer: COMMERCIAL

## 2024-07-15 ENCOUNTER — ONCOLOGY VISIT (OUTPATIENT)
Dept: ONCOLOGY | Facility: CLINIC | Age: 64
End: 2024-07-15
Payer: COMMERCIAL

## 2024-07-15 VITALS
DIASTOLIC BLOOD PRESSURE: 76 MMHG | BODY MASS INDEX: 25.43 KG/M2 | SYSTOLIC BLOOD PRESSURE: 119 MMHG | HEART RATE: 89 BPM | TEMPERATURE: 98.1 F | WEIGHT: 141.4 LBS | RESPIRATION RATE: 16 BRPM | OXYGEN SATURATION: 95 %

## 2024-07-15 DIAGNOSIS — C23 MALIGNANT NEOPLASM OF GALLBLADDER (H): Primary | ICD-10-CM

## 2024-07-15 DIAGNOSIS — Z79.899 OTHER LONG TERM (CURRENT) DRUG THERAPY: ICD-10-CM

## 2024-07-15 LAB
ALBUMIN SERPL BCG-MCNC: 4 G/DL (ref 3.5–5.2)
ALP SERPL-CCNC: 218 U/L (ref 40–150)
ALT SERPL W P-5'-P-CCNC: 14 U/L (ref 0–50)
ANION GAP SERPL CALCULATED.3IONS-SCNC: 10 MMOL/L (ref 7–15)
AST SERPL W P-5'-P-CCNC: 31 U/L (ref 0–45)
BASOPHILS # BLD AUTO: 0.1 10E3/UL (ref 0–0.2)
BASOPHILS NFR BLD AUTO: 1 %
BILIRUB SERPL-MCNC: 0.9 MG/DL
BUN SERPL-MCNC: 10 MG/DL (ref 8–23)
CALCIUM SERPL-MCNC: 8.9 MG/DL (ref 8.8–10.2)
CHLORIDE SERPL-SCNC: 108 MMOL/L (ref 98–107)
CREAT SERPL-MCNC: 0.6 MG/DL (ref 0.51–0.95)
EGFRCR SERPLBLD CKD-EPI 2021: >90 ML/MIN/1.73M2
EOSINOPHIL # BLD AUTO: 0.5 10E3/UL (ref 0–0.7)
EOSINOPHIL NFR BLD AUTO: 5 %
ERYTHROCYTE [DISTWIDTH] IN BLOOD BY AUTOMATED COUNT: 17.2 % (ref 10–15)
GLUCOSE SERPL-MCNC: 117 MG/DL (ref 70–99)
HCO3 SERPL-SCNC: 21 MMOL/L (ref 22–29)
HCT VFR BLD AUTO: 40 % (ref 35–47)
HGB BLD-MCNC: 13.3 G/DL (ref 11.7–15.7)
IMM GRANULOCYTES # BLD: 0 10E3/UL
IMM GRANULOCYTES NFR BLD: 1 %
LYMPHOCYTES # BLD AUTO: 2.7 10E3/UL (ref 0.8–5.3)
LYMPHOCYTES NFR BLD AUTO: 31 %
MCH RBC QN AUTO: 31.3 PG (ref 26.5–33)
MCHC RBC AUTO-ENTMCNC: 33.3 G/DL (ref 31.5–36.5)
MCV RBC AUTO: 94 FL (ref 78–100)
MONOCYTES # BLD AUTO: 0.9 10E3/UL (ref 0–1.3)
MONOCYTES NFR BLD AUTO: 10 %
NEUTROPHILS # BLD AUTO: 4.6 10E3/UL (ref 1.6–8.3)
NEUTROPHILS NFR BLD AUTO: 52 %
NRBC # BLD AUTO: 0 10E3/UL
NRBC BLD AUTO-RTO: 0 /100
PLATELET # BLD AUTO: 280 10E3/UL (ref 150–450)
POTASSIUM SERPL-SCNC: 3.5 MMOL/L (ref 3.4–5.3)
PROT SERPL-MCNC: 6.9 G/DL (ref 6.4–8.3)
RBC # BLD AUTO: 4.25 10E6/UL (ref 3.8–5.2)
SODIUM SERPL-SCNC: 139 MMOL/L (ref 135–145)
WBC # BLD AUTO: 8.7 10E3/UL (ref 4–11)

## 2024-07-15 PROCEDURE — G2211 COMPLEX E/M VISIT ADD ON: HCPCS

## 2024-07-15 PROCEDURE — 80053 COMPREHEN METABOLIC PANEL: CPT

## 2024-07-15 PROCEDURE — 99213 OFFICE O/P EST LOW 20 MIN: CPT

## 2024-07-15 PROCEDURE — 85025 COMPLETE CBC W/AUTO DIFF WBC: CPT

## 2024-07-15 PROCEDURE — 99214 OFFICE O/P EST MOD 30 MIN: CPT

## 2024-07-15 PROCEDURE — 36415 COLL VENOUS BLD VENIPUNCTURE: CPT

## 2024-07-15 ASSESSMENT — PAIN SCALES - GENERAL: PAINLEVEL: NO PAIN (0)

## 2024-07-15 NOTE — PROGRESS NOTES
[facility]  Medical Oncology Follow Up Note  Jul 15, 2024      Patient name: Deann Durand  YOB: 1960      Oncologic History:  Diagnosis: Localized, resected (R0) gallbladder cancer (adenocarcinoma), negative (R0) margins, involvement of perimuscular adipose tissue on peritoneal side, poorly differentiated with lymphovascular invasion (LVI) present, 4/4 portal LN involved  Stage of cancer: [id number]     Prior treatment(s):   - 1) laparoscopic cholecystectomy on 2/2/24  - 2) Laparoscopic re-exploration with resection of liver (segment 4b/5) and portal lymphadenectomy on 3/7/24    Current treatment(s):  Adjuvant systemic therapy with oral capecitabine x 6 months (planned)    Treatment intent:  Curative     Care Team  Medical oncologist: Albert Mayers MD ([facility])  Surgical oncologist:  Tacho [place] and Silviano Hamilton MD ([facility])  Other members of care team: Dr. Munoz (General Surgery at [facility]) , Domi Darden (PCP) at [facility] [place] [place]   Primary caregiver at home/ contact:  Libra Beard (daughter) at [phone]    Reason for consultation/ patient visit:  Consideration of adjuvant therapy for resected gallbladder cancer     History of presenting illness:  Ms. Deann Durand is a 63 year old female with recent diagnosis of gallbladder cancer (now resected x 2) who presents to establish medical oncology care. Her oncologic history (as obtained from chart review, patient interview) is summarized below  -    12/23 and 1/24 - presented with chronic, vague abdominal pain and dyspepsia. Diagnosed with symptomatic cholelithiasis.   -2/2/24 : laparoscopic cholecystectomy with Dr. Munoz ([facility]) at [facility]. Pathology showed poorly differentiated adenocarcinoma of gallbladder, 2.4cm in size, negative margins in cystic duct and liver side. pT2a with perimuscular and lymphovascular invasion noted.   -2/13/24: CT CAP - no evidence of metastatic disease.    -2/21/24 : [place] [facility] [place] with partial hepatic resection and portal lymphadenectomy.   -2/26/24 : Saw surgeon Dr. Silviano Hamilton at [facility], concurred with above.   -3/07/24 : underwent laparoscopic liver resection (of segment 4b/5) and portal lympadenectomy  -3/25/24 : visit with Med oncology to discuss adjuvant therapy    -4/11/24: new baseline CT CAP demonstrating no evidence of metastatic disease within the abdomen or pelvis, mildly prominent soft tissue at the eduard hepatis which may be postsurgical in etiology or a prominent eduard hepatic lymph node, and borderline enlarged gastrohepatic lymph node.       Sparkle presents today for follow up and consideration of next cycle of capecitabine.     Interval history:  Sparkle continues to tolerate capecitabine well and denies any concerns today. She finished the last cycle on Thursday 7/11 and is scheduled to begin the next on 7/18. She already has received the next refill.     She has an occasional episode of nausea, not severe enough to take any antiemetics, resolve quickly. She has 1-2 days a month with looser stools, otherwise no bowel concerns. No abdominal pain or cramping. Her appetite is good, she is eating and drinking well.     No skin concerns. Energy level has been good, she continues to work in her garden often and is returning to work on 7/22.         Review of Systems:  Patient denies any of the following except if noted above: fevers, chills, difficulty with energy, vision or hearing changes, chest pain, dyspnea, abdominal pain, nausea, vomiting, diarrhea, constipation, urinary concerns, headaches, numbness, tingling, issues with sleep or mood. Also denies lumps, bumps, rashes or skin lesions, bleeding or bruising issues.           Past medical history:  GERD  Bilateral carpal tunnel syndrome    Past surgical history:  Laparoscopic Cholecystectomy (2/2/24)  Laparoscopic Liver resection (seg 4b/5) and portal  lymphadenectomy (3/7/24)  Bladder suspension   cervical disc arthroplasty   hysterectomy   wisdom teeth extraction    Social history:   Currently lives independently in [place], [place]. Works at [employer] in [place], worked there for last 5 years. Jeet Smyth lives in [place], [place] (30min away from her) and involved in her care, also has health issues of her own. She also has two neices in [place] who check in on her. Loves to garden. 2 daughters, 4 grandkids,  Former smoker, 45 pack year history, currently smoking 1pack per day.       Family history:  EtOh use disorder in her birth father, birth mother, and brother;   Cancer -  father -throat cacner, uncle had colon cancer, maternal grandmother - throat cancer, and sister - lung cancer, another sister - stomach and colon cancer;   Dementia in her sister.     Allergies:   Allergies   Allergen Reactions    Morphine Nausea and Vomiting    Nicotine Dizziness       Outpatient medications:     Current Outpatient Medications:     acetaminophen (TYLENOL) 325 MG tablet, Take 3 tablets by mouth 3 times daily, Disp: , Rfl:     albuterol (PROAIR HFA/PROVENTIL HFA/VENTOLIN HFA) 108 (90 Base) MCG/ACT inhaler, Inhale 1-2 puffs into the lungs every 4 hours as needed, Disp: , Rfl:     capecitabine (XELODA) 500 MG tablet, Take 2 tablets (1,000 mg) by mouth 2 times daily for 14 days Days 1 through 14, then off for 7 days.Take with water within 30 minutes after a meal., Disp: 56 tablet, Rfl: 0    capecitabine (XELODA) 500 MG tablet, Take 2 tablets (1,000 mg) by mouth 2 times daily for 14 days Days 1 through 14, then off for 7 days.Take with water within 30 minutes after a meal., Disp: 56 tablet, Rfl: 0    capecitabine (XELODA) 500 MG tablet, Take 2 tablets (1,000 mg) by mouth 2 times daily for 14 days Days 1 through 14, then off for 7 days.Take with water within 30 minutes after a meal., Disp: 56 tablet, Rfl: 0    cyclobenzaprine (FLEXERIL) 5 MG tablet, Take 5 mg by  mouth 2 times daily as needed (Patient not taking: Reported on 5/29/2024), Disp: , Rfl:     escitalopram (LEXAPRO) 10 MG tablet, Take 1 tablet by mouth daily at 2 pm (Patient not taking: Reported on 5/29/2024), Disp: , Rfl:     famotidine (PEPCID) 20 MG tablet, Take 1 tablet (20 mg) by mouth 2 times daily, Disp: 60 tablet, Rfl: 3    lidocaine (LIDODERM) 5 % patch, Place onto the skin every 24 hours (Patient not taking: Reported on 5/29/2024), Disp: , Rfl:     MULTIPLE VITAMINS-MINERALS PO, Take 1 tablet by mouth daily (Patient not taking: Reported on 5/29/2024), Disp: , Rfl:     omeprazole (PRILOSEC) 20 MG DR capsule, Take 20 mg by mouth daily (Patient not taking: Reported on 5/29/2024), Disp: , Rfl:     ondansetron (ZOFRAN) 4 MG tablet, Take 4 mg by mouth every 6 hours as needed for nausea or vomiting (Patient not taking: Reported on 5/29/2024), Disp: , Rfl:     oxyCODONE (ROXICODONE) 5 MG tablet, Take 5 mg by mouth every 6 hours as needed (Patient not taking: Reported on 5/29/2024), Disp: , Rfl:     polyethylene glycol (MIRALAX) 17 GM/Dose powder, Take 17 g by mouth daily (Patient not taking: Reported on 5/29/2024), Disp: , Rfl:     prochlorperazine (COMPAZINE) 10 MG tablet, Take 1 tablet (10 mg) by mouth every 6 hours as needed for nausea or vomiting (Patient not taking: Reported on 5/29/2024), Disp: 30 tablet, Rfl: 2    senna (SENOKOT) 8.6 MG tablet, Take 8.6 mg by mouth 2 times daily as needed (Patient not taking: Reported on 5/29/2024), Disp: , Rfl:     traZODone (DESYREL) 50 MG tablet, Take 50 mg by mouth nightly as needed for sleep (Patient not taking: Reported on 5/29/2024), Disp: , Rfl:         Physical Exam:  General: The patient is a pleasant female in no acute distress.  /76 (BP Location: Right arm, Patient Position: Sitting, Cuff Size: Adult Regular)   Pulse 89   Temp 98.1  F (36.7  C) (Oral)   Resp 16   Wt 64.1 kg (141 lb 6.4 oz)   SpO2 95%   BMI 25.43 kg/m    Wt Readings from Last 10  Encounters:   07/15/24 64.1 kg (141 lb 6.4 oz)   05/29/24 63 kg (139 lb)   05/08/24 63 kg (138 lb 14.4 oz)   04/16/24 61.6 kg (135 lb 14.4 oz)   04/01/24 61.2 kg (135 lb)   02/26/24 63.8 kg (140 lb 11.2 oz)   HEENT: EOMI. Sclerae are anicteric. Oral mucosa is pink and moist without lesions or thrush.   Lymph: Neck is supple with no lymphadenopathy in the cervical or supraclavicular areas.   Heart: Regular rate and rhythm.   Lungs: Clear to auscultation bilaterally, normal work of breathing   Abdomen: Bowel sounds present, soft, nontender with no palpable hepatosplenomegaly or masses.   Extremities: No lower extremity edema noted bilaterally.   Neuro: Cranial nerves II through XII are grossly intact.  Skin: No rashes, petechiae, or bruising noted on exposed skin.      Lab data:    Most Recent 3 CBC's:  Recent Labs   Lab Test 07/15/24  1205 06/24/24  1226 05/29/24  1402   WBC 8.7 9.1 7.3   HGB 13.3 13.3 13.7   MCV 94 92 93    267 295   ANEUTAUTO 4.6 4.4 3.6     Most Recent 3 BMP's:  Recent Labs   Lab Test 07/15/24  1205 06/24/24  1226 05/29/24  1402    140 141   POTASSIUM 3.5 3.4 3.7   CHLORIDE 108* 105 109*   CO2 21* 21* 21*   BUN 10.0 15.0 16.0   CR 0.60 0.69 0.58   ANIONGAP 10 14 11   RAMEZ 8.9 9.0 9.1   * 131* 118*   PROTTOTAL 6.9 7.2 6.9   ALBUMIN 4.0 4.1 4.0    Most Recent 3 LFT's:  Recent Labs   Lab Test 07/15/24  1205 06/24/24  1226 05/29/24  1402   AST 31 28 21   ALT 14 17 <5   ALKPHOS 218* 285* 177*   BILITOTAL 0.9 1.0 0.3    Most Recent 2 TSH and T4:No lab results found.  I reviewed the above labs today.            Assessment and Plan:    Gallbladder adenocarcinoma.   Ms. Deann Durand is a 63 year old female with prior history of chronic smoking who presented initially with dyspepsia and abdominal pain, underwent cholecystectomy for what was presumed to be symptomatic cholelithiasis and was diagnosed with gallbladder cancer after it was incidentally discovered on histopathological  analysis of cholecystectomy specimen. She underwent repeat surgery with resection of liver bed(seg 4b and 5) and portal lymphadenectomy with evidence of clear margins but involvement of portal LN.    New baseline CT CAP was obtained 4/11 with no evidence of of metastatic disease within the abdomen or pelvis, mildly prominent soft tissue at the eduard hepatis which may be postsurgical in etiology or a prominent eduard hepatic lymph node, and borderline enlarged gastrohepatic lymph node.    Initiated adjuvant therapy with capecitabine (1000mg BID) on 4/21. Tolerating treatment well with mild diarrhea.  Today's labs reviewed without concern, proceed with cycle 5 as scheduled on 7/18. Will request repeat imaging and follow up with Dr. Mayers prior to next cycle.       Smoking.   Currently working on reducing amount of cigarettes daily, but not interested in cessation right now. Offered resources available, she will let us know if she would like to pursue.       GERD.   Previously on Protonix daily. Transitioned to famotidine, taking daily with no issues.       The longitudinal plan of care for the diagnosis(es)/condition(s) as documented were addressed during this visit. Due to the added complexity in care, I will continue to support Sparkle in the subsequent management and with ongoing continuity of care.      MALIK Varela CNP

## 2024-07-15 NOTE — NURSING NOTE
Chief Complaint   Patient presents with    Oncology Clinic Visit     Gallbladder CA    Blood Draw     Labs drawn via  by RN. VS taken.     Labs collected from venipuncture by RN. Vitals taken. Checked in for appointment(s).     Hilda Payne RN

## 2024-07-15 NOTE — Clinical Note
7/15/2024      Deann Durand  [address]  [facility] [address]      Dear Colleague,    Thank you for referring your patient, Deann Durand, to the [facility]. Please see a copy of my visit note below.    [facility]  Medical Oncology Follow Up Note  Jul 15, 2024      Patient name: Deann Durand  YOB: 1960      Oncologic History:  Diagnosis: Localized, resected (R0) gallbladder cancer (adenocarcinoma), negative (R0) margins, involvement of perimuscular adipose tissue on peritoneal side, poorly differentiated with lymphovascular invasion (LVI) present, 4/4 portal LN involved  Stage of cancer: [id number]     Prior treatment(s):   - 1) laparoscopic cholecystectomy on 2/2/24  - 2) Laparoscopic re-exploration with resection of liver (segment 4b/5) and portal lymphadenectomy on 3/7/24    Current treatment(s):  Adjuvant systemic therapy with oral capecitabine x 6 months (planned)    Treatment intent:  Curative     Care Team  Medical oncologist: Albert Mayers MD ([facility])  Surgical oncologist:  Tacho Harris MD ([facility] [place]) and Silviano Hamilton MD ([facility])  Other members of care team: Dr. Munoz (General Surgery at [facility]) , Domi Darden (PCP) at [facility] [place]   Primary caregiver at home/ contact:  Libra Beard (daughter) at [phone]    Reason for consultation/ patient visit:  Consideration of adjuvant therapy for resected gallbladder cancer     History of presenting illness:  Ms. Deann Durand is a 63 year old female with recent diagnosis of gallbladder cancer (now resected x 2) who presents to establish medical oncology care. Her oncologic history (as obtained from chart review, patient interview) is summarized below  -    12/23 and 1/24 - presented with chronic, vague abdominal pain and dyspepsia. Diagnosed with symptomatic cholelithiasis.   -2/2/24 : laparoscopic cholecystectomy with Dr. Munoz ([facility]) at [facility]. Pathology showed  poorly differentiated adenocarcinoma of gallbladder, 2.4cm in size, negative margins in cystic duct and liver side. pT2a with perimuscular and lymphovascular invasion noted.   -2/13/24: CT CAP - no evidence of metastatic disease.   -2/21/24 : [place]acho [place] [facility] [place] repeat surgery with partial hepatic resection and portal lymphadenectomy.   -2/26/24 : Saw surgeon Dr. Silviano Hamilton at [facility], concurred with above.   -3/07/24 : underwent laparoscopic liver resection (of segment 4b/5) and portal lympadenectomy  -3/25/24 : visit with Med oncology to discuss adjuvant therapy    -4/11/24: new baseline CT CAP demonstrating no evidence of metastatic disease within the abdomen or pelvis, mildly prominent soft tissue at the eduard hepatis which may be postsurgical in etiology or a prominent eduard hepatic lymph node, and borderline enlarged gastrohepatic lymph node.       Sparkle presents today for follow up and consideration of cycle *** capecitabine.     Interval history:    -1-2 days a month of diarrhea/looser stools  -random bouts of nausea, nothing consistent and nothing that persists   -eating and drinking well       Finished on Thursday, scheduled to start again 7/18, already have     Going back to work on 7/22       ***   She finished capecitabine on 5/27 and is scheduled to begin the next cycle on 6/3. Capecitabine is scheduled to be delivered on Friday 5/31.     Last cycle she had nausea and diarrhea during the off week, up to 3 bowel movements per day. Nausea was not severe enough for her to take PRN antiemetics. Throughout chemo, her bowels are loose. She is eating and drinking well.     She has a couple days of fatigue during the off week, otherwise her energy is good. She has been busy outside with her large garden.     Denies skin concerns. No pain.       Review of Systems:  Patient denies any of the following except if noted above: fevers, chills, difficulty with energy, vision or hearing  changes, chest pain, dyspnea, abdominal pain, nausea, vomiting, diarrhea, constipation, urinary concerns, headaches, numbness, tingling, issues with sleep or mood. Also denies lumps, bumps, rashes or skin lesions, bleeding or bruising issues.           Past medical history:  GERD  Bilateral carpal tunnel syndrome    Past surgical history:  Laparoscopic Cholecystectomy (2/2/24)  Laparoscopic Liver resection (seg 4b/5) and portal lymphadenectomy (3/7/24)  Bladder suspension   cervical disc arthroplasty   hysterectomy   wisdom teeth extraction    Social history:   Currently lives independently in [place], [place]. Works at [employer] in [place], worked there for last 5 years. Jeet Smyth lives in [place], [place] (30min away from her) and involved in her care, also has health issues of her own. She also has two neices in [place] who check in on her. Loves to garden. 2 daughters, 4 grandkids,  Former smoker, 45 pack year history, currently smoking 1pack per day.       Family history:  EtOh use disorder in her birth father, birth mother, and brother;   Cancer -  father -throat cacner, uncle had colon cancer, maternal grandmother - throat cancer, and sister - lung cancer, another sister - stomach and colon cancer;   Dementia in her sister.     Allergies:   Allergies   Allergen Reactions    Morphine Nausea and Vomiting    Nicotine Dizziness       Outpatient medications:     Current Outpatient Medications:     acetaminophen (TYLENOL) 325 MG tablet, Take 3 tablets by mouth 3 times daily, Disp: , Rfl:     albuterol (PROAIR HFA/PROVENTIL HFA/VENTOLIN HFA) 108 (90 Base) MCG/ACT inhaler, Inhale 1-2 puffs into the lungs every 4 hours as needed, Disp: , Rfl:     capecitabine (XELODA) 500 MG tablet, Take 2 tablets (1,000 mg) by mouth 2 times daily for 14 days Days 1 through 14, then off for 7 days.Take with water within 30 minutes after a meal., Disp: 56 tablet, Rfl: 0    capecitabine (XELODA) 500 MG tablet, Take 2  tablets (1,000 mg) by mouth 2 times daily for 14 days Days 1 through 14, then off for 7 days.Take with water within 30 minutes after a meal., Disp: 56 tablet, Rfl: 0    capecitabine (XELODA) 500 MG tablet, Take 2 tablets (1,000 mg) by mouth 2 times daily for 14 days Days 1 through 14, then off for 7 days.Take with water within 30 minutes after a meal., Disp: 56 tablet, Rfl: 0    cyclobenzaprine (FLEXERIL) 5 MG tablet, Take 5 mg by mouth 2 times daily as needed (Patient not taking: Reported on 5/29/2024), Disp: , Rfl:     escitalopram (LEXAPRO) 10 MG tablet, Take 1 tablet by mouth daily at 2 pm (Patient not taking: Reported on 5/29/2024), Disp: , Rfl:     famotidine (PEPCID) 20 MG tablet, Take 1 tablet (20 mg) by mouth 2 times daily, Disp: 60 tablet, Rfl: 3    lidocaine (LIDODERM) 5 % patch, Place onto the skin every 24 hours (Patient not taking: Reported on 5/29/2024), Disp: , Rfl:     MULTIPLE VITAMINS-MINERALS PO, Take 1 tablet by mouth daily (Patient not taking: Reported on 5/29/2024), Disp: , Rfl:     omeprazole (PRILOSEC) 20 MG DR capsule, Take 20 mg by mouth daily (Patient not taking: Reported on 5/29/2024), Disp: , Rfl:     ondansetron (ZOFRAN) 4 MG tablet, Take 4 mg by mouth every 6 hours as needed for nausea or vomiting (Patient not taking: Reported on 5/29/2024), Disp: , Rfl:     oxyCODONE (ROXICODONE) 5 MG tablet, Take 5 mg by mouth every 6 hours as needed (Patient not taking: Reported on 5/29/2024), Disp: , Rfl:     polyethylene glycol (MIRALAX) 17 GM/Dose powder, Take 17 g by mouth daily (Patient not taking: Reported on 5/29/2024), Disp: , Rfl:     prochlorperazine (COMPAZINE) 10 MG tablet, Take 1 tablet (10 mg) by mouth every 6 hours as needed for nausea or vomiting (Patient not taking: Reported on 5/29/2024), Disp: 30 tablet, Rfl: 2    senna (SENOKOT) 8.6 MG tablet, Take 8.6 mg by mouth 2 times daily as needed (Patient not taking: Reported on 5/29/2024), Disp: , Rfl:     traZODone (DESYREL) 50 MG  tablet, Take 50 mg by mouth nightly as needed for sleep (Patient not taking: Reported on 5/29/2024), Disp: , Rfl:         Physical Exam:  General: The patient is a pleasant female in no acute distress.  There were no vitals taken for this visit.  Wt Readings from Last 10 Encounters:   05/29/24 63 kg (139 lb)   05/08/24 63 kg (138 lb 14.4 oz)   04/16/24 61.6 kg (135 lb 14.4 oz)   04/01/24 61.2 kg (135 lb)   02/26/24 63.8 kg (140 lb 11.2 oz)   HEENT: EOMI. Sclerae are anicteric. Oral mucosa is pink and moist without lesions or thrush.   Lymph: Neck is supple with no lymphadenopathy in the cervical or supraclavicular areas.   Heart: Regular rate and rhythm.   Lungs: Clear to auscultation bilaterally, normal work of breathing   Abdomen: Bowel sounds present, soft, nontender with no palpable hepatosplenomegaly or masses.   Extremities: No lower extremity edema noted bilaterally.   Neuro: Cranial nerves II through XII are grossly intact.  Skin: No rashes, petechiae, or bruising noted on exposed skin.      Lab data:    Most Recent 3 CBC's:  Recent Labs   Lab Test 06/24/24 1226 05/29/24  1402 05/08/24  0950   WBC 9.1 7.3 7.6   HGB 13.3 13.7 13.5   MCV 92 93 92    295 303   ANEUTAUTO 4.4 3.6 4.0     Most Recent 3 BMP's:  Recent Labs   Lab Test 06/24/24  1226 05/29/24  1402 05/08/24  0950    141 142   POTASSIUM 3.4 3.7 4.0   CHLORIDE 105 109* 110*   CO2 21* 21* 21*   BUN 15.0 16.0 17.0   CR 0.69 0.58 0.77   ANIONGAP 14 11 11   RAMEZ 9.0 9.1 8.9   * 118* 100*   PROTTOTAL 7.2 6.9 7.1   ALBUMIN 4.1 4.0 4.1    Most Recent 3 LFT's:  Recent Labs   Lab Test 06/24/24 1226 05/29/24  1402 05/08/24  0950   AST 28 21 23   ALT 17 <5 11   ALKPHOS 285* 177* 156*   BILITOTAL 1.0 0.3 0.5    Most Recent 2 TSH and T4:No lab results found.  I reviewed the above labs today.            Assessment and Plan:    Gallbladder adenocarcinoma.   Ms. Deann Durand is a 63 year old female with prior history of chronic smoking who  presented initially with dyspepsia and abdominal pain, underwent cholecystectomy for what was presumed to be symptomatic cholelithiasis and was diagnosed with gallbladder cancer after it was incidentally discovered on histopathological analysis of cholecystectomy specimen. She underwent repeat surgery with resection of liver bed(seg 4b and 5) and portal lymphadenectomy with evidence of clear margins but involvement of portal LN.    New baseline CT CAP was obtained 4/11 with no evidence of of metastatic disease within the abdomen or pelvis, mildly prominent soft tissue at the eduard hepatis which may be postsurgical in etiology or a prominent eduard hepatic lymph node, and borderline enlarged gastrohepatic lymph node.    Initiated adjuvant therapy with capecitabine (1000mg BID) on 4/21. Tolerating treatment well with mild diarrhea, nausea and fatigue. Today's labs reviewed without concern, proceed with cycle 3 as scheduled on 6/3. She will go to [facility] for labs prior to next cycle. REJI follow up prior to cycle 6 scheduled 7/10. Patient to call sooner with any concerns.  Plan for repeat imaging and MD follow up in 3 months, plan for 8 cycles total.       Smoking.   Currently working on reducing amount of cigarettes daily, but not interested in cessation right now. Offered resources available, she will let us know if she would like to pursue.       GERD.   Previously on Protonix daily. Transitioned to famotidine, taking daily with no issues.       The longitudinal plan of care for the diagnosis(es)/condition(s) as documented were addressed during this visit. Due to the added complexity in care, I will continue to support Sparkle in the subsequent management and with ongoing continuity of care.      *** minutes spent on the date of the encounter doing chart review, review of test results, interpretation of tests, patient visit, and documentation       MALIK Varela CNP          Again, thank you for allowing me to  participate in the care of your patient.        Sincerely,        MALIK Varela CNP

## 2024-07-15 NOTE — NURSING NOTE
"Oncology Rooming Note    July 15, 2024 12:14 PM   Deann Durand is a 63 year old female who presents for:    Chief Complaint   Patient presents with    Oncology Clinic Visit     Gallbladder CA    Blood Draw     Labs drawn via  by RN. VS taken.     Initial Vitals: /76 (BP Location: Right arm, Patient Position: Sitting, Cuff Size: Adult Regular)   Pulse 89   Temp 98.1  F (36.7  C) (Oral)   Resp 16   Wt 64.1 kg (141 lb 6.4 oz)   SpO2 95%   BMI 25.43 kg/m   Estimated body mass index is 25.43 kg/m  as calculated from the following:    Height as of 5/29/24: 1.588 m (5' 2.52\").    Weight as of this encounter: 64.1 kg (141 lb 6.4 oz). Body surface area is 1.68 meters squared.  No Pain (0) Comment: Data Unavailable   No LMP recorded. (Menstrual status: UNKNOWN).  Allergies reviewed: Yes  Medications reviewed: Yes    Medications: Medication refills not needed today.  Pharmacy name entered into [facility]:    [id number] - [place], [place] - [address], [place] - [address] [place] [address]    Frailty Screening:   Is the patient here for a new oncology consult visit in cancer care? 2. No      Clinical concerns:        Pascale Hopson              "

## 2024-07-17 ENCOUNTER — TELEPHONE (OUTPATIENT)
Dept: ONCOLOGY | Facility: CLINIC | Age: 64
End: 2024-07-17
Payer: COMMERCIAL

## 2024-07-17 NOTE — TELEPHONE ENCOUNTER
Report of Work Ability forms received via Email     Forms to be completed and put in folder for provider to approve.    Email: [email or web address]    Danelle Harris

## 2024-07-17 NOTE — TELEPHONE ENCOUNTER
Report of work ability forms filled out and put in providers folder for review and signature.      Danelle Harris

## 2024-07-21 DIAGNOSIS — C23 MALIGNANT NEOPLASM OF GALLBLADDER (H): Primary | ICD-10-CM

## 2024-07-22 NOTE — TELEPHONE ENCOUNTER
Report of Work Ability paperwork completed, checked for accuracy, signed and faxed to [employer] @ [phone]. A copy was made, sent to scanning and original emailed to patient at [email or web address].    Successful transmission verified in Right Fax.      Ny Cruz

## 2024-07-24 ENCOUNTER — HOSPITAL ENCOUNTER (OUTPATIENT)
Dept: CT IMAGING | Facility: HOSPITAL | Age: 64
Discharge: HOME | End: 2024-07-24
Attending: STUDENT IN AN ORGANIZED HEALTH CARE EDUCATION/TRAINING PROGRAM | Admitting: STUDENT IN AN ORGANIZED HEALTH CARE EDUCATION/TRAINING PROGRAM
Payer: COMMERCIAL

## 2024-07-24 DIAGNOSIS — C22.1 CHOLANGIOCARCINOMA (H): ICD-10-CM

## 2024-07-24 PROCEDURE — 71260 CT THORAX DX C+: CPT

## 2024-07-24 PROCEDURE — 250N000009 HC RX 250: Performed by: STUDENT IN AN ORGANIZED HEALTH CARE EDUCATION/TRAINING PROGRAM

## 2024-07-24 PROCEDURE — 250N000011 HC RX IP 250 OP 636: Performed by: STUDENT IN AN ORGANIZED HEALTH CARE EDUCATION/TRAINING PROGRAM

## 2024-07-24 RX ORDER — IOPAMIDOL 755 MG/ML
69 INJECTION, SOLUTION INTRAVASCULAR ONCE
Status: COMPLETED | OUTPATIENT
Start: 2024-07-24 | End: 2024-07-24

## 2024-07-24 RX ADMIN — SODIUM CHLORIDE 67 ML: 9 INJECTION, SOLUTION INTRAVENOUS at 17:03

## 2024-07-24 RX ADMIN — IOPAMIDOL 69 ML: 755 INJECTION, SOLUTION INTRAVENOUS at 17:02

## 2024-07-25 ENCOUNTER — TELEPHONE (OUTPATIENT)
Dept: GASTROENTEROLOGY | Facility: CLINIC | Age: 64
End: 2024-07-25
Payer: COMMERCIAL

## 2024-07-25 DIAGNOSIS — C22.1 CHOLANGIOCARCINOMA (H): Primary | ICD-10-CM

## 2024-07-25 NOTE — TELEPHONE ENCOUNTER
Advanced Endoscopy     Referring provider:  Albert Mayers MD     Referred to: [facility]     Provider Requested: none specified     Referral Received: 7/24/24     Records received:  EPIC    CT CAP with contrast 7/24/24  IMPRESSION:  1.  Prior cholecystectomy and partial hepatectomy. There is a single gastrohepatic ligament node which measures 13 x 13 mm diameter today, larger than on CT April 11, 2024. No other interval change or evidence of progressive disease.     Latest Reference Range & Units 07/15/24 12:05   Alkaline Phosphatase 40 - 150 U/L 218 (H)   ALT 0 - 50 U/L 14   AST 0 - 45 U/L 31   Bilirubin Total <=1.2 mg/dL 0.9   Glucose 70 - 99 mg/dL 117 (H)      Latest Reference Range & Units 04/11/24 14:07   Cancer Antigen 19-9 <=35 U/mL 84 (H)     CT CAP with contrast 4/11/24  IMPRESSION:  No definite findings of metastatic disease within the abdomen or pelvis. Mildly prominent soft tissue at the eduard hepatis which may be postsurgical in etiology or a prominent eduard hepatic lymph node. Borderline enlarged gastrohepatic lymph node.   Attention on follow-up     Images received:      Insurance Coverage:  [employer]    Evaluation for:  EUS - cholangiocarcinoma    Gallbladder cancer s/p resection, with enlarged gastrohepatic LN, sampling for cancer recurrence, thank you.        Clinical History (per RN review):   Office visit with Kori Guaman in oncology 7/15/24    Assessment and Plan:     Gallbladder adenocarcinoma.   Ms. Deann Durand is a 63 year old female with prior history of chronic smoking who presented initially with dyspepsia and abdominal pain, underwent cholecystectomy for what was presumed to be symptomatic cholelithiasis and was diagnosed with gallbladder cancer after it was incidentally discovered on histopathological analysis of cholecystectomy specimen. She underwent repeat surgery with resection of liver bed(seg 4b and 5) and portal lymphadenectomy with evidence of  clear margins but involvement of portal LN.     New baseline CT CAP was obtained 4/11 with no evidence of of metastatic disease within the abdomen or pelvis, mildly prominent soft tissue at the eduard hepatis which may be postsurgical in etiology or a prominent eduard hepatic lymph node, and borderline enlarged gastrohepatic lymph node.     Initiated adjuvant therapy with capecitabine (1000mg BID) on 4/21. Tolerating treatment well with mild diarrhea, nausea and fatigue. Today's labs reviewed without concern, proceed with cycle 3 as scheduled on 6/3. She will go to [place] for labs prior to next cycle. REJI follow up prior to cycle 6 scheduled 7/10. Patient to call sooner with any concerns.  Plan for repeat imaging and MD follow up in 3 months, plan for 8 cycles total.         Smoking.   Currently working on reducing amount of cigarettes daily, but not interested in cessation right now. Offered resources available, she will let us know if she would like to pursue.         GERD.   Previously on Protonix daily. Transitioned to famotidine, taking daily with no issues.   MD review date:   MD Decision for clinic consultation/Orders:            Referral updates/Patient contacted:

## 2024-07-26 ENCOUNTER — PATIENT OUTREACH (OUTPATIENT)
Dept: GASTROENTEROLOGY | Facility: CLINIC | Age: 64
End: 2024-07-26
Payer: COMMERCIAL

## 2024-07-26 DIAGNOSIS — C23 MALIGNANT NEOPLASM OF GALLBLADDER (H): Primary | ICD-10-CM

## 2024-07-26 NOTE — TELEPHONE ENCOUNTER
Called pt to discuss referral from Dr Mayers and Dr Montague's recommendations.    Procedure/Imaging/Clinic: Upper EUS   Physician: Eddi   Timing: Within 2-3 weeks   Scope time needed:Standard [facility] slot   Anesthesia:MAC   Dx: History of resected gallbladder cancer. Gastrohepatic ligament adenopathy   Tier:Tier 2 - Not life/limb threatening but potential for  patient morbidity/mortality or adverse  patient/disease outcome if  surgery/procedure not done within 30 day   Location: [facility]   Header of letter for pt communication:    Examination of the abdomen with ultrasound from inside the stomach. Biopsy of lymph node.     Comments:  Dr Mayers    Pt in agreement with scheduling this as soon as possible. Offered potential date of 8/6    Explained they can expect a call from  for date of procedure, will need a , someone to stay with them for 24 hours and should stay in town for 24 hours (within 45 min of Hospital) post procedure    Does patient have any history of gastric bypass/gastric surgery/altered panc/bili anatomy? none    Does patient have [employer] insurance?:[employer]    Med Review    Blood thinner -  none  ASA - none  Diabetic - none  Any meds by injection or mouth for weight loss or diabetes- none    Patient Education r/t procedure: letter in mail    A pre-op nurse will call 1-2 days prior to the procedure.    NPO/Prep:   Adults and Children of all ages may consume solids up to 8 hours prior to arrival time - may consume clear liquids up to 1 hour prior to arrival time.    Other specific details/comments:      Verbalized understanding of all instructions. All questions answered.     Procedure order placed, message routed to Endo

## 2024-07-29 ENCOUNTER — TELEPHONE (OUTPATIENT)
Dept: GASTROENTEROLOGY | Facility: CLINIC | Age: 64
End: 2024-07-29
Payer: COMMERCIAL

## 2024-07-29 DIAGNOSIS — C23 MALIGNANT NEOPLASM OF GALLBLADDER (H): Primary | ICD-10-CM

## 2024-07-29 RX ORDER — CAPECITABINE 500 MG/1
1000 TABLET, FILM COATED ORAL 2 TIMES DAILY
Qty: 56 TABLET | Refills: 0 | Status: SHIPPED | OUTPATIENT
Start: 2024-07-29 | End: 2024-08-12

## 2024-07-29 NOTE — LETTER
July 29, 2024      Deann FELIPA Durand  [address]   [facility] [address]              Endoscopic ultrasound (EUS)     Procedure date: 8/6/2024    Anticipated arrival time: 12:30 PM   (Please note that arrival times may change)    Facility location: [facility]; [address] [place] [address], [place] [address] - Check in location: Main entrance at registration desk. Parking information: Self pay parking available in the Patient & Visitor Ramp on the corner of  [facility].  options are available 24 hours for patients with mobility limitations. Self-park and shuttle service from the parking ramp available. The phone number for shuttle requests is [phone] open Monday-Friday 5:00am-7:00pm      Important Procedure Reminders:     Prep Instructions:   Instructions on how to prepare for your upcoming procedure are found below. Please read instructions carefully. Deviation from instructions may result in less than desired outcomes and procedure may need to be rescheduled.   If you have additional questions regarding how to prepare for your upcoming procedure, contact our endoscopy pre assessment nurses at [phone] option 4 Monday through Friday 7:00am-5:00pm     Policy:   The medications used during the procedure will make you sleepy, so you won't be able to drive. On the day of your procedure, please have an adult ready to drive you home and stay with you for the next 24 hours.   You can't use public transportation, ride-share services, or non-medical taxi services without a responsible caregiver. Medical transport services are okay, but a caregiver must be there to receive you at your destination.   Make sure your  and caregiver are confirmed before your procedure.      Day of procedure:  Please keep in mind that arrival times may change. Let your  know there might be a one-hour window for changes.  We ask that you please check in at the  with your  . Your  should remain on campus.  Expect to be at the procedure center for about 1.5-2.5 hours.    Please do not wear jewelry (i.e. earrings, rings, necklaces, watches, etc). Leave your purse, billfold, credit cards, and other valuables at home.   Bring insurance card and ID.     To cancel or reschedule your procedure:   Within 14 days of your procedure if you develop any flu-like symptoms (such as fever, cough, shortness of breath), COVID-19 like symptoms or exposure to COVID-19, contact our endoscopy team at [phone] option 4 to determine if procedure can be completed or needs to be delayed.   If you need to cancel or reschedule, our endoscopy scheduling team can be reached at [phone], option 2. Monday through Friday, 7:00am-5:00pm.      Medication Reminders:    Please note the following medication holding recommendations:   N/A    ----------------------------------------------------------------------------------------------------------------------------------------------------------------------------------------------------------------------------------------------------------------------      Endoscopic Ultrasound  Check in at: [facility]; [address] [facility], [place] [address] - Check in location: Main entrance at registration desk. Parking information: Self pay parking available in the Patient & Visitor Ramp on the corner of  [facility] and [facility].  options are available 24 hours for patients with mobility limitations. Self-park and shuttle service from the parking ramp available. The phone number for shuttle requests is [phone].  Pre-Assessment Phone Number: [facility] - [address] [place] [address], [place] [address] - [phone] option 4        Before your procedure, a nurse will call you to go over instructions and your health history. It's important to complete the nurse assessment before your procedure. If you  don't, your procedure might have to be canceled.    On the day of your procedure, please have an adult ready to drive you home and stay with you for the next 24 hours. The medications used during the procedure will make you sleepy, so you won't be able to drive.  You can't use public transportation, ride-share services, or non-medical taxi services without a responsible caregiver. Medical transport services are okay, but a caregiver must be there to receive you at your destination. Make sure your  and caregiver are confirmed before your procedure.    Your procedure arrival time could change, so please keep that in mind and let your  know there might be a one-hour window for changes. When you arrive, check in at the  with your . Your  should stay on campus. Plan to be at the procedure center for about 1.5-2.5 hours.          What is an Endoscopic Ultrasound (EUS)?  EUS is an ultrasound examination of the upper part of your gastro-intestinal (GI) tract and surrounding organs.  This includes the esophagus (food tube), stomach, duodenum (the first part of the bowel), the liver, pancreas, gallbladder, spleen and lymph nodes in this area.      Important: You must complete all steps before the exam.    Getting ready   - Review the instructions the day before your exam.   - Check with your insurance company to be sure they will cover this exam.  - If you are having sedation, you must arrange for an adult to drive you home. This adult must also stay with you for 24 hours (unless your provider tells you otherwise).  - If you have advanced kidney disease or are on dialysis: please call the endoscopy center. We may need to change how you get ready for this exam.    Seven days before the exam   - Talk to your doctor: If you take blood-thinners (such as Coumadin, Plavix, Xarelto), your prescription or schedule may need to change before the test.  - Talk to your prescribing provider: If you take  prescription NSAIDS (such as Sulindac, Celebrex, Mobic, Relafen). Your prescribing provider will tell you what to do.   - Continue taking prescribed aspirin; talk to your prescribing doctor with any concerns.    - If you have diabetes: Ask to have your exam early in the morning. Also, ask your doctor if you should change your diet or medicines    One day before the exam   - Stop eating all solid foods 8 hours prior to arrival time. You may drink clear liquids.   ** If you have achalasia (treated or untreated) or gastroparesis, please be on a clear liquid diet for 24 hours prior to your exam and stop drinking all liquids at midnight.   - Stop taking sucralfate medication.  - Stop taking NSAID pain relievers, such as Advil, Ibuprofen, Motrin, etc.  You may take Tylenol.    On the day of your exam  - Your stomach must be empty during the study. If your procedure is scheduled late in the day, be sure to drink a lot of fluids the day prior to EUS.   - You may drink clear liquids until 2 hours before your arrival time.  - Do not smoke or swallow anything, including water or gum for at least 2 hours before your arrival time. This is a safety issue. Your procedure could be cancelled if you do not follow directions.  - No chewing tobacco 6 hours prior to procedure arrival time.   - Please do not wear jewelry (i.e. earrings, rings, necklaces, watches, etc) . Leave your purse, billfold, credit cards, and other valuables at home.   - You may take all of your morning medicines (except for diabetes pills) as usual with 4 oz. of water up to 2 hours before your arrival time.    What are clear liquids?   You may have:  - Water, tea, coffee (no cream)  - Soda pop, Gatorade (not red or purple)  - Clear nutrition drinks (Enlive, Resource Breeze)   - Jell-O, Popsicles (no milk or fruit pieces) or sorbet (not red or purple)  - Fat-free soup broth or bouillon  - Plain hard cand, such as clear life savers (not red or purple)  - Clear  juices and fruit-flavored drinks such as apple juice, white grape juice, Hi-C and Jozef-Aid (not red or purple)   Do not have:  - Milk or milk products such as ice cream, malts or shakes  - Red or purple drinks of any kind such as cranberry juice or grape juice. Avoid red or purple Jell-O, Popsicles, Jozef-Aid, sorbet and candy  - Juices with pulp such as orange, grapefruit, pineapple or tomato juice  - Cream soups of any kind  - Alcohol       What to expect  - The doctor will explain the exam and you will be asked to sign a consent form.  - The doctor gently inserts the scope into your mouth.   - This exam does not take long. If you request sedation, you will not be able to drive for 24 hours.   - The doctor may take a small piece of tissue for study in the lab. This is called a biopsy and it will not hurt.    After the exam  - Your doctor will discuss test results with you. If you have any questions, ask your doctor or nurse.  - You may have a sore throat, nausea, and belly pain or cramping. This is normal.   - NOTE: If you are sedated for this exam, you will need an adult  to take you home and stay with you for 24 hours, unless otherwise stated by your provider.    Test results  - You will receive your results in 7 to 10 business days by phone, letter or MyChart.    For questions or appointments, call:  [facility]   [phone], option 2  Monday through Friday, 7 a.m. to 5 p.m.  (If it is after hours please reach out to the clinic or provider you were scheduled with.)    You should be aware that your endoscopist may be a part of a study to improve endoscopy procedures.  As part of a study, pictures gathered from your procedure may be stored and analyzed in a de-identified manner.

## 2024-07-29 NOTE — TELEPHONE ENCOUNTER
Pre assessment completed for upcoming procedure.   (Please see previous telephone encounter notes for complete details)      Procedure details:    Arrival time and facility location reviewed.    Pre op exam needed? No.    Designated  policy reviewed. Instructed to have someone stay 24  hours post procedure.       Medication review:    Medications reviewed. Please see supporting documentation below. Holding recommendations discussed (if applicable).   Ferrous Sulfate (iron supplement): HOLD 7 days before procedure.      Prep for procedure:     Procedure prep instructions reviewed.        Any additional information needed:  N/A      Patient  verbalized understanding and had no questions or concerns at this time.      Miracle Anderson RN  Endoscopy Procedure Pre Assessment   [phone] option 4

## 2024-07-29 NOTE — TELEPHONE ENCOUNTER
"Endoscopy Scheduling Screen    Have you had a positive Covid test in the last 14 days?  No      What is your communication preference for Instructions and/or Bowel Prep?   Mail/USPS      What insurance is in the chart?  Other:      Ordering/Referring Provider: JULIUS HERRERA   (If ordering provider performs procedure, schedule with ordering provider unless otherwise instructed. )    BMI: Estimated body mass index is 25.43 kg/m  as calculated from the following:    Height as of 5/29/24: 1.588 m (5' 2.52\").    Weight as of 7/15/24: 64.1 kg (141 lb 6.4 oz).     Sedation Ordered  MAC/deep sedation.   BMI<= 45 45 < BMI <= 48 48 < BMI < = 50  BMI > 50   No Restrictions No [facility]  No [facility]  [facility] with exceptions Hospital Only OR Only         Do you have a history of malignant hyperthermia?  No      (Females) Are you currently pregnant?        Have you been diagnosed or told you have pulmonary hypertension?   No      Do you have an LVAD?  No      Have you been told you have moderate to severe sleep apnea?  No    Have you been told you have COPD, asthma, or any other lung disease?  No      Do you have any heart conditions?  No       Have you ever had or are you waiting for an organ transplant?  No. Continue scheduling, no site restrictions.      Have you had a stroke or transient ischemic attack (TIA aka \"mini stroke\" in the last 6 months?   No      Have you been diagnosed with or been told you have cirrhosis of the liver?   No      Are you currently on dialysis?   No      Do you need assistance transferring?   No    BMI: Estimated body mass index is 25.43 kg/m  as calculated from the following:    Height as of 5/29/24: 1.588 m (5' 2.52\").    Weight as of 7/15/24: 64.1 kg (141 lb 6.4 oz).     Is patients BMI > 40 and scheduling location [facility]?  No      Do you take an injectable medication for weight loss or diabetes (excluding insulin)?  No    Do you take the medication Naltrexone?  No    Do you take blood thinners?  No "       Prep   Are you currently on dialysis or do you have chronic kidney disease?  No    Do you have a diagnosis of diabetes?  No    Do you have a diagnosis of cystic fibrosis (CF)?  No    On a regular basis do you go 3 -5 days between bowel movements?  N/a    BMI > 40?      Preferred Pharmacy:        Final Scheduling Details     Procedure scheduled  Endoscopic ultrasound (EUS) - UPPER    Surgeon:  RUSSEL     Date of procedure:  8/6/24     Pre-OP / PAC:   No - Not required for this site.    Location  [facility] - Per order.    Sedation   MAC/Deep Sedation - Per order.      Patient Reminders:   You will receive a call from a Nurse to review instructions and health history.  This assessment must be completed prior to your procedure.  Failure to complete the Nurse assessment may result in the procedure being cancelled.      On the day of your procedure, please designate an adult(s) who can drive you home stay with you for the next 24 hours. The medicines used in the exam will make you sleepy. You will not be able to drive.      You cannot take public transportation, ride share services, or non-medical taxi service without a responsible caregiver.  Medical transport services are allowed with the requirement that a responsible caregiver will receive you at your destination.  We require that drivers and caregivers are confirmed prior to your procedure.

## 2024-07-29 NOTE — TELEPHONE ENCOUNTER
Pre visit planning completed.      Procedure details:    Patient scheduled for Endoscopic ultrasound (EUS) on 8/6/2024.     Arrival time: 1230. Procedure time 1400    Facility location: [facility]; [address] [place] [address], [address] Floor, [place] [address]. Check in location: Main entrance at registration desk.    Sedation type: MAC    Pre op exam needed? No.    Indication for procedure: Malignant neoplasm of gallbladder (H) [C23]       Chart review:     Electronic implanted devices? No    Recent diagnosis of diverticulitis within the last 6 weeks? No      Medication review:    Diabetic? No    Anticoagulants? No    Weight loss medication/injectable? No.    NSAIDS? No NSAID medications per patient's medication list.  RN will verify with pre-assessment call.    Other medication HOLDING recommendations:  N/A      Prep for procedure:     Prep instructions sent via [employer]         Miracle Anderson RN  Endoscopy Procedure Pre Assessment RN  [phone] option 4

## 2024-08-06 ENCOUNTER — ANESTHESIA (OUTPATIENT)
Dept: GASTROENTEROLOGY | Facility: CLINIC | Age: 64
End: 2024-08-06
Payer: COMMERCIAL

## 2024-08-06 ENCOUNTER — HOSPITAL ENCOUNTER (OUTPATIENT)
Facility: CLINIC | Age: 64
Discharge: HOME | End: 2024-08-06
Attending: INTERNAL MEDICINE | Admitting: INTERNAL MEDICINE
Payer: COMMERCIAL

## 2024-08-06 ENCOUNTER — ANESTHESIA EVENT (OUTPATIENT)
Dept: GASTROENTEROLOGY | Facility: CLINIC | Age: 64
End: 2024-08-06
Payer: COMMERCIAL

## 2024-08-06 VITALS
DIASTOLIC BLOOD PRESSURE: 79 MMHG | RESPIRATION RATE: 16 BRPM | SYSTOLIC BLOOD PRESSURE: 142 MMHG | OXYGEN SATURATION: 96 % | TEMPERATURE: 97.5 F

## 2024-08-06 PROCEDURE — 88173 CYTOPATH EVAL FNA REPORT: CPT | Mod: 26 | Performed by: PATHOLOGY

## 2024-08-06 PROCEDURE — 43242 EGD US FINE NEEDLE BX/ASPIR: CPT | Performed by: INTERNAL MEDICINE

## 2024-08-06 PROCEDURE — 250N000011 HC RX IP 250 OP 636: Performed by: ANESTHESIOLOGY

## 2024-08-06 PROCEDURE — 250N000009 HC RX 250: Performed by: ANESTHESIOLOGY

## 2024-08-06 PROCEDURE — 370N000017 HC ANESTHESIA TECHNICAL FEE, PER MIN: Performed by: INTERNAL MEDICINE

## 2024-08-06 PROCEDURE — 43238 EGD US FINE NEEDLE BX/ASPIR: CPT | Performed by: REGISTERED NURSE

## 2024-08-06 PROCEDURE — 88305 TISSUE EXAM BY PATHOLOGIST: CPT | Mod: 26 | Performed by: PATHOLOGY

## 2024-08-06 PROCEDURE — 88305 TISSUE EXAM BY PATHOLOGIST: CPT | Mod: TC | Performed by: INTERNAL MEDICINE

## 2024-08-06 PROCEDURE — 88172 CYTP DX EVAL FNA 1ST EA SITE: CPT | Mod: 26 | Performed by: PATHOLOGY

## 2024-08-06 PROCEDURE — 43238 EGD US FINE NEEDLE BX/ASPIR: CPT | Performed by: ANESTHESIOLOGY

## 2024-08-06 PROCEDURE — 258N000003 HC RX IP 258 OP 636: Performed by: ANESTHESIOLOGY

## 2024-08-06 RX ORDER — NALOXONE HYDROCHLORIDE 0.4 MG/ML
0.1 INJECTION, SOLUTION INTRAMUSCULAR; INTRAVENOUS; SUBCUTANEOUS
Status: DISCONTINUED | OUTPATIENT
Start: 2024-08-06 | End: 2024-08-06 | Stop reason: HOSPADM

## 2024-08-06 RX ORDER — ONDANSETRON 2 MG/ML
4 INJECTION INTRAMUSCULAR; INTRAVENOUS EVERY 30 MIN PRN
Status: DISCONTINUED | OUTPATIENT
Start: 2024-08-06 | End: 2024-08-06 | Stop reason: HOSPADM

## 2024-08-06 RX ORDER — ONDANSETRON 4 MG/1
4 TABLET, ORALLY DISINTEGRATING ORAL EVERY 6 HOURS PRN
Status: DISCONTINUED | OUTPATIENT
Start: 2024-08-06 | End: 2024-08-06 | Stop reason: HOSPADM

## 2024-08-06 RX ORDER — FENTANYL CITRATE 50 UG/ML
INJECTION, SOLUTION INTRAMUSCULAR; INTRAVENOUS PRN
Status: DISCONTINUED | OUTPATIENT
Start: 2024-08-06 | End: 2024-08-06

## 2024-08-06 RX ORDER — LIDOCAINE HYDROCHLORIDE 20 MG/ML
INJECTION, SOLUTION INFILTRATION; PERINEURAL PRN
Status: DISCONTINUED | OUTPATIENT
Start: 2024-08-06 | End: 2024-08-06

## 2024-08-06 RX ORDER — ONDANSETRON 4 MG/1
4 TABLET, ORALLY DISINTEGRATING ORAL EVERY 30 MIN PRN
Status: DISCONTINUED | OUTPATIENT
Start: 2024-08-06 | End: 2024-08-06 | Stop reason: HOSPADM

## 2024-08-06 RX ORDER — ONDANSETRON 2 MG/ML
4 INJECTION INTRAMUSCULAR; INTRAVENOUS EVERY 6 HOURS PRN
Status: DISCONTINUED | OUTPATIENT
Start: 2024-08-06 | End: 2024-08-06 | Stop reason: HOSPADM

## 2024-08-06 RX ORDER — ACETAMINOPHEN 325 MG/1
975 TABLET ORAL
Status: DISCONTINUED | OUTPATIENT
Start: 2024-08-06 | End: 2024-08-06 | Stop reason: HOSPADM

## 2024-08-06 RX ORDER — NALOXONE HYDROCHLORIDE 0.4 MG/ML
0.2 INJECTION, SOLUTION INTRAMUSCULAR; INTRAVENOUS; SUBCUTANEOUS
Status: DISCONTINUED | OUTPATIENT
Start: 2024-08-06 | End: 2024-08-06 | Stop reason: HOSPADM

## 2024-08-06 RX ORDER — FLUMAZENIL 0.1 MG/ML
0.2 INJECTION, SOLUTION INTRAVENOUS
Status: DISCONTINUED | OUTPATIENT
Start: 2024-08-06 | End: 2024-08-06 | Stop reason: HOSPADM

## 2024-08-06 RX ORDER — LIDOCAINE 40 MG/G
CREAM TOPICAL
Status: DISCONTINUED | OUTPATIENT
Start: 2024-08-06 | End: 2024-08-06 | Stop reason: HOSPADM

## 2024-08-06 RX ORDER — DEXAMETHASONE SODIUM PHOSPHATE 4 MG/ML
4 INJECTION, SOLUTION INTRA-ARTICULAR; INTRALESIONAL; INTRAMUSCULAR; INTRAVENOUS; SOFT TISSUE
Status: DISCONTINUED | OUTPATIENT
Start: 2024-08-06 | End: 2024-08-06 | Stop reason: HOSPADM

## 2024-08-06 RX ORDER — NALOXONE HYDROCHLORIDE 0.4 MG/ML
0.4 INJECTION, SOLUTION INTRAMUSCULAR; INTRAVENOUS; SUBCUTANEOUS
Status: DISCONTINUED | OUTPATIENT
Start: 2024-08-06 | End: 2024-08-06 | Stop reason: HOSPADM

## 2024-08-06 RX ORDER — PROPOFOL 10 MG/ML
INJECTION, EMULSION INTRAVENOUS PRN
Status: DISCONTINUED | OUTPATIENT
Start: 2024-08-06 | End: 2024-08-06

## 2024-08-06 RX ORDER — PROPOFOL 10 MG/ML
INJECTION, EMULSION INTRAVENOUS CONTINUOUS PRN
Status: DISCONTINUED | OUTPATIENT
Start: 2024-08-06 | End: 2024-08-06

## 2024-08-06 RX ORDER — SODIUM CHLORIDE, SODIUM LACTATE, POTASSIUM CHLORIDE, CALCIUM CHLORIDE 600; 310; 30; 20 MG/100ML; MG/100ML; MG/100ML; MG/100ML
INJECTION, SOLUTION INTRAVENOUS CONTINUOUS PRN
Status: DISCONTINUED | OUTPATIENT
Start: 2024-08-06 | End: 2024-08-06

## 2024-08-06 RX ADMIN — PROPOFOL 20 MG: 10 INJECTION, EMULSION INTRAVENOUS at 14:58

## 2024-08-06 RX ADMIN — PROPOFOL 10 MG: 10 INJECTION, EMULSION INTRAVENOUS at 14:54

## 2024-08-06 RX ADMIN — PROPOFOL 20 MG: 10 INJECTION, EMULSION INTRAVENOUS at 14:50

## 2024-08-06 RX ADMIN — LIDOCAINE HYDROCHLORIDE 60 MG: 20 INJECTION, SOLUTION INFILTRATION; PERINEURAL at 14:43

## 2024-08-06 RX ADMIN — FENTANYL CITRATE 50 MCG: 50 INJECTION INTRAMUSCULAR; INTRAVENOUS at 15:01

## 2024-08-06 RX ADMIN — SODIUM CHLORIDE, POTASSIUM CHLORIDE, SODIUM LACTATE AND CALCIUM CHLORIDE: 600; 310; 30; 20 INJECTION, SOLUTION INTRAVENOUS at 14:38

## 2024-08-06 RX ADMIN — PROPOFOL 20 MG: 10 INJECTION, EMULSION INTRAVENOUS at 14:51

## 2024-08-06 RX ADMIN — PROPOFOL 150 MCG/KG/MIN: 10 INJECTION, EMULSION INTRAVENOUS at 14:44

## 2024-08-06 RX ADMIN — PROPOFOL 20 MG: 10 INJECTION, EMULSION INTRAVENOUS at 14:44

## 2024-08-06 ASSESSMENT — ACTIVITIES OF DAILY LIVING (ADL)
ADLS_ACUITY_SCORE: 35

## 2024-08-06 ASSESSMENT — ENCOUNTER SYMPTOMS: SEIZURES: 0

## 2024-08-06 NOTE — PROGRESS NOTES
Pre-procedure note:    63 year old female with a PMH of recent diagnosis of gallbladder cancer (s/p lap enrique 2/2/24 and partial hepatic resection 2/21/24), who was noted to have  enlarged gastrohepatic ligament node measuring 13 x 13 mm concerning for recurrence of disease.     Ref: Albert Mayers MD

## 2024-08-06 NOTE — DISCHARGE INSTRUCTIONS
Discharge Instructions after Endoscopic Ultrasound    Activity  You were given medicine for pain. You may be dizzy or sleepy.    For 24 hours:  Do not drive or use heavy equipment.  Do not make important decisions.  Do not drink any alcohol.    Diet  Wait one hour before eating or drinking. Start with sips of water. When your gag reflex has returned you  may go back to your usual diet, medicines and light exercise.    Discomfort  Some bloating is normal. You may have large burps or pass air.  You may have a sore throat for 2 to 3 days. It may help to:  Avoid hot liquids for 24 hours.  Use sore throat lozenges.  Gargle as needed with salt water up to 4 times a day. Mix 1 cup of warm water with 1 teaspoon of salt. Do not swallow.  You may take Tylenol (acetaminophen) for pain unless your doctor has told you not to.    Do not take aspirin or ibuprofen (Advil, Motrin, or other anti-inflammatory  drugs) for 3 days.    Follow-up  We took small tissue or fluid samples to study. We will call you with the results in about 10 working days.    When to call    Call right away if you have:  Severe throat pain or trouble swallowing  Black stools (tar-like looking bowel movement)  Fever above 100.6 F (37.5 C)  Unusual pain in belly or chest not relieved by belching or passing air.    If you vomit blood or have severe pain, go to an emergency room.    If you have questions, call    Monday to Friday, 8 a.m. to 4:30 p.m.:   Central Scheduling Department: [phone]  After hours: Hospital: [phone] (Ask for the GI fellow on call)

## 2024-08-06 NOTE — ANESTHESIA POSTPROCEDURE EVALUATION
Patient: Deann Durand    Procedure: Procedure(s):  ESOPHAGOGASTRODUODENOSCOPY, WITH FINE NEEDLE ASPIRATION BIOPSY, WITH ENDOSCOPIC ULTRASOUND GUIDANCE       Anesthesia Type:  MAC    Note:  Disposition: Outpatient   Postop Pain Control: Uneventful            Sign Out: Well controlled pain   PONV: No   Neuro/Psych: Uneventful            Sign Out: Acceptable/Baseline neuro status   Airway/Respiratory: Uneventful            Sign Out: Acceptable/Baseline resp. status   CV/Hemodynamics: Uneventful            Sign Out: Acceptable CV status; No obvious hypovolemia; No obvious fluid overload   Other NRE: NONE   DID A NON-ROUTINE EVENT OCCUR? No           Last vitals:  Vitals Value Taken Time   /75 08/06/24 1600   Temp     Pulse     Resp 16 08/06/24 1550   SpO2 96 % 08/06/24 1609   Vitals shown include unfiled device data.    Electronically Signed By: JULIET CAMILO MD  August 6, 2024  4:10 PM

## 2024-08-06 NOTE — ANESTHESIA PREPROCEDURE EVALUATION
Anesthesia Pre-Procedure Evaluation    Patient: Deann Durand   MRN: [id number] : 1960        Procedure : Procedure(s):  Endoscopic ultrasound upper gastrointestinal tract (GI)          Past Medical History:   Diagnosis Date    Depression     Gastroesophageal reflux disease without esophagitis     Malignant neoplasm of gallbladder (H)     Sleep disorder       Past Surgical History:   Procedure Laterality Date    CARPAL TUNNEL RELEASE RT/LT      CERVICAL SPINE SURGERY      No hardware in place    HYSTERECTOMY      LAPAROSCOPIC CHOLECYSTECTOMY      TUBAL LIGATION        Allergies   Allergen Reactions    Morphine Nausea and Vomiting    Nicotine Dizziness      Social History     Tobacco Use    Smoking status: Every Day     Current packs/day: 1.00     Average packs/day: 1 pack/day for 45.0 years (45.0 ttl pk-yrs)     Types: Cigarettes    Smokeless tobacco: Not on file   Substance Use Topics    Alcohol use: Not Currently      Wt Readings from Last 1 Encounters:   07/15/24 64.1 kg (141 lb 6.4 oz)        Anesthesia Evaluation   Pt has had prior anesthetic.     No history of anesthetic complications       ROS/MED HX  ENT/Pulmonary:    (-) asthma   Neurologic:    (-) no seizures and no CVA   Cardiovascular:       METS/Exercise Tolerance:     Hematologic:    (-) anemia   Musculoskeletal:       GI/Hepatic:     (+) GERD, Asymptomatic on medication,                  Renal/Genitourinary:    (-) renal disease   Endo:       Psychiatric/Substance Use:       Infectious Disease:       Malignancy: Comment: Gallbladder adenocarcinoma  (+) Malignancy, History of GI.GI CA  Active status post Surgery.      Other:            Physical Exam    Airway        Mallampati: II   TM distance: > 3 FB   Neck ROM: limited   Mouth opening: > 3 cm    Respiratory Devices and Support         Dental       (+) Edentulous      Cardiovascular          Rhythm and rate: regular and normal     Pulmonary           breath sounds clear to auscultation  "          OUTSIDE LABS:  CBC:   Lab Results   Component Value Date    WBC 8.7 07/15/2024    WBC 9.1 06/24/2024    HGB 13.3 07/15/2024    HGB 13.3 06/24/2024    HCT 40.0 07/15/2024    HCT 39.0 06/24/2024     07/15/2024     06/24/2024     BMP:   Lab Results   Component Value Date     07/15/2024     06/24/2024    POTASSIUM 3.5 07/15/2024    POTASSIUM 3.4 06/24/2024    CHLORIDE 108 (H) 07/15/2024    CHLORIDE 105 06/24/2024    CO2 21 (L) 07/15/2024    CO2 21 (L) 06/24/2024    BUN 10.0 07/15/2024    BUN 15.0 06/24/2024    CR 0.60 07/15/2024    CR 0.69 06/24/2024     (H) 07/15/2024     (H) 06/24/2024     COAGS: No results found for: \"PTT\", \"INR\", \"FIBR\"  POC: No results found for: \"BGM\", \"HCG\", \"HCGS\"  HEPATIC:   Lab Results   Component Value Date    ALBUMIN 4.0 07/15/2024    PROTTOTAL 6.9 07/15/2024    ALT 14 07/15/2024    AST 31 07/15/2024    ALKPHOS 218 (H) 07/15/2024    BILITOTAL 0.9 07/15/2024     OTHER:   Lab Results   Component Value Date    RAMEZ 8.9 07/15/2024       Anesthesia Plan    ASA Status:  3    NPO Status:  NPO Appropriate    Anesthesia Type: MAC.     - Reason for MAC: straight local not clinically adequate      Maintenance: TIVA.        Consents    Anesthesia Plan(s) and associated risks, benefits, and realistic alternatives discussed. Questions answered and patient/representative(s) expressed understanding.     - Discussed:     - Discussed with:  Patient            Postoperative Care    Pain management: Multi-modal analgesia.   PONV prophylaxis: Background Propofol Infusion, Ondansetron (or other 5HT-3)     Comments:               JULIET CAMILO MD    I have reviewed the pertinent notes and labs in the chart from the past 30 days and (re)examined the patient.  Any updates or changes from those notes are reflected in this note.                  "

## 2024-08-06 NOTE — ANESTHESIA CARE TRANSFER NOTE
Patient: Deann Durand    Procedure: Procedure(s):  ESOPHAGOGASTRODUODENOSCOPY, WITH FINE NEEDLE ASPIRATION BIOPSY, WITH ENDOSCOPIC ULTRASOUND GUIDANCE       Diagnosis: Malignant neoplasm of gallbladder (H) [C23]  Diagnosis Additional Information: No value filed.    Anesthesia Type:   MAC     Note:    Oropharynx: oropharynx clear of all foreign objects  Level of Consciousness: awake  Oxygen Supplementation: room air    Independent Airway: airway patency satisfactory and stable  Dentition: dentition unchanged  Vital Signs Stable: post-procedure vital signs reviewed and stable  Report to RN Given: handoff report given  Patient transferred to: Phase II    Handoff Report: Identifed the Patient, Identified the Reponsible Provider, Reviewed the pertinent medical history, Discussed the surgical course, Reviewed Intra-OP anesthesia mangement and issues during anesthesia, Set expectations for post-procedure period and Allowed opportunity for questions and acknowledgement of understanding  Vitals:  Vitals Value Taken Time   /75 08/06/24 1547   Temp     Pulse     Resp     SpO2 96 % 08/06/24 1547   Vitals shown include unfiled device data.    Electronically Signed By: MALIK Childers CRNA  August 6, 2024  3:48 PM

## 2024-08-08 LAB
PATH REPORT.COMMENTS IMP SPEC: ABNORMAL
PATH REPORT.COMMENTS IMP SPEC: YES
PATH REPORT.FINAL DX SPEC: ABNORMAL
PATH REPORT.GROSS SPEC: ABNORMAL
PATH REPORT.MICROSCOPIC SPEC OTHER STN: ABNORMAL
PATH REPORT.RELEVANT HX SPEC: ABNORMAL

## 2024-08-09 ENCOUNTER — TELEPHONE (OUTPATIENT)
Dept: GASTROENTEROLOGY | Facility: CLINIC | Age: 64
End: 2024-08-09
Payer: COMMERCIAL

## 2024-08-09 LAB — UPPER EUS: NORMAL

## 2024-08-09 NOTE — TELEPHONE ENCOUNTER
Final cytology from gastrohepatic ligament node returned positive for metastatic adenocarcinoma.    Unable to reach pt by phone.   Confirmed that she has an oncology visit scheduled this Monday. Message sent to Dr. Mayers re results.    TRISTEN Montague MD  Professor of Medicine  Division of Gastroenterology, Hepatology and Nutrition  [facility]

## 2024-08-12 ENCOUNTER — TELEPHONE (OUTPATIENT)
Dept: ONCOLOGY | Facility: CLINIC | Age: 64
End: 2024-08-12

## 2024-08-12 ENCOUNTER — ONCOLOGY VISIT (OUTPATIENT)
Dept: ONCOLOGY | Facility: CLINIC | Age: 64
End: 2024-08-12
Payer: COMMERCIAL

## 2024-08-12 VITALS
TEMPERATURE: 97.9 F | HEART RATE: 84 BPM | OXYGEN SATURATION: 97 % | DIASTOLIC BLOOD PRESSURE: 72 MMHG | WEIGHT: 136.9 LBS | BODY MASS INDEX: 24.62 KG/M2 | RESPIRATION RATE: 20 BRPM | SYSTOLIC BLOOD PRESSURE: 117 MMHG

## 2024-08-12 DIAGNOSIS — C23 MALIGNANT NEOPLASM OF GALLBLADDER (H): Primary | ICD-10-CM

## 2024-08-12 PROCEDURE — 99417 PROLNG OP E/M EACH 15 MIN: CPT | Performed by: STUDENT IN AN ORGANIZED HEALTH CARE EDUCATION/TRAINING PROGRAM

## 2024-08-12 PROCEDURE — 99215 OFFICE O/P EST HI 40 MIN: CPT | Performed by: STUDENT IN AN ORGANIZED HEALTH CARE EDUCATION/TRAINING PROGRAM

## 2024-08-12 PROCEDURE — 99213 OFFICE O/P EST LOW 20 MIN: CPT | Performed by: STUDENT IN AN ORGANIZED HEALTH CARE EDUCATION/TRAINING PROGRAM

## 2024-08-12 ASSESSMENT — PAIN SCALES - GENERAL: PAINLEVEL: NO PAIN (0)

## 2024-08-12 NOTE — NURSING NOTE
"Oncology Rooming Note    August 12, 2024 1:43 PM   Deann Durand is a 64 year old female who presents for:    Chief Complaint   Patient presents with    Oncology Clinic Visit     Malignant neoplasm of gallbladder     Initial Vitals: /72 (BP Location: Right arm, Patient Position: Sitting, Cuff Size: Adult Regular)   Pulse 84   Temp 97.9  F (36.6  C) (Oral)   Resp 20   Wt 62.1 kg (136 lb 14.4 oz)   SpO2 97%   BMI 24.62 kg/m   Estimated body mass index is 24.62 kg/m  as calculated from the following:    Height as of 5/29/24: 1.588 m (5' 2.52\").    Weight as of this encounter: 62.1 kg (136 lb 14.4 oz). Body surface area is 1.66 meters squared.  No Pain (0) Comment: Data Unavailable   No LMP recorded. Patient has had a hysterectomy.  Allergies reviewed: Yes  Medications reviewed: Yes    Medications: Medication refills not needed today.  Pharmacy name entered into Bl[facility]:    [facility] - [place], [place] - [address], [place] - [address] [place] [address]    Frailty Screening:   Is the patient here for a new oncology consult visit in cancer care? 2. No      Clinical concerns: Patient wants to discuss results of her CT scan and biopsy from last Wednesday. Patient has also noticed that her right hand has been really shaky since starting the chemotherapy. Her feet have also been swelling.  Dr. Mayers was notified.      Arjun Hernandez  "

## 2024-08-12 NOTE — TELEPHONE ENCOUNTER
[facility] Cancer Care Oral Chemotherapy Monitoring Program    Thank you for the opportunity to be a part in the care of this patient's oral chemotherapy. The oncology pharmacy will no longer be following this patient for oral chemotherapy. If there are any questions or the plan changes, feel free to contact us.        5/20/2024     8:00 AM 6/14/2024    10:00 AM 6/19/2024     3:00 PM 6/20/2024     8:00 AM 7/8/2024     9:00 AM 7/29/2024    10:00 AM 8/12/2024     2:00 PM   ORAL CHEMOTHERAPY   Assessment Type Refill Refill Monthly Follow up Monthly Follow up Refill Refill Discontinuation   Stop Date       8/12/2024   Reason for Discontinuation       Disease progression   Diagnosis Code Gallbladder Cancer Gallbladder Cancer Gallbladder Cancer Gallbladder Cancer Gallbladder Cancer Gallbladder Cancer Gallbladder Cancer   Providers Dr. Talib Mayers   [facility]   Is this patient followed by the [facility] OC team? No No No       Drug Name Xeloda (capecitabine) Xeloda (capecitabine) Xeloda (capecitabine) Xeloda (capecitabine) Xeloda (capecitabine) Xeloda (capecitabine) Xeloda (capecitabine)   Dose 1,000 mg 1,000 mg 1,000 mg 1,000 mg 1,000 mg 1,000 mg 1,000 mg   Current Schedule BID BID BID BID BID BID BID   Cycle Details 2 weeks on, 1 week off 2 weeks on, 1 week off 2 weeks on, 1 week off 2 weeks on, 1 week off 2 weeks on, 1 week off 2 weeks on, 1 week off 2 weeks on, 1 week off   Start Date of Last Cycle  6/3/2024  6/3/2024      Planned next cycle start date 6/4/2024 6/24/2024 6/27/2024      Doses missed in last 2 weeks    4      Adherence Assessment    Non-adherent      Reason for Non-adherence    Patient forgets      Adverse Effects    Other (See Note for Details);Diarrhea      Diarrhea    Grade 1      Pharmacist Intervention(diarrhea)    No      Pharmacist intervention(other)     Yes      Intervention(s)    Referral to oncology provider        Placed call to [facility] as she had questions about what to do with her unused oral chemotherapy. Left message for call back.      Nayeli Dickey, PharmD, [facility]  Oral Chemotherapy Monitoring Program  [facility]  [phone]

## 2024-08-12 NOTE — PROGRESS NOTES
[facility]  Medical Oncology Patient Visit Note    Patient name: Deann Durand  YOB: 1960    Oncologic History:  Diagnosis: Localized, resected (R0) gallbladder cancer (adenocarcinoma), negative (R0) margins, involvement of perimuscular adipose tissue on peritoneal side, poorly differentiated with lymphovascular invasion (LVI) present, 4/4 portal LN involved  Stage of cancer: [id number]     --> relapse in 7/2024 with anika met    Caris: Her2 IHC 0 (neg), JEAN CARLOS, TMB 2, no actionable alterations, NFE2L2 and TET2 mutations      Prior treatment(s):   - 1) laparoscopic cholecystectomy on 2/2/24  - 2) Laparoscopic re-exploration with resection of liver (segment 4b/5) and portal lymphadenectomy on 3/7/24  - 3) adjuvant capecitabine 4/2024- 7/2024    Current treatment(s):  Adjuvant systemic therapy with oral capecitabine x 6 months (planned)    Treatment intent:  Curative     Care Team  Medical oncologist: Albert Mayers MD ([facility])  Surgical oncologist:  Tacho Harris MD ([facility]) and Silviano Hamilton MD ([facility])  Other members of care team: Dr. Munoz (General Surgery at [facility]) , Domi Darden (PCP) at [facility]   Primary caregiver at home/ contact:  Libra Beard (daughter) at [phone]    Reason for consultation/ patient visit:  Consideration of adjuvant therapy for resected gallbladder cancer     History of presenting illness:  Ms. Deann Durand is a 63 year old female with recent diagnosis of gallbladder cancer (now resected x 2) who presents to establish medical oncology care. Her oncologic history (as obtained from chart review, patient interview) is summarized below  -    12/23 and 1/24 - presented with chronic, vague abdominal pain and dyspepsia. Diagnosed with symptomatic cholelithiasis.   -2/2/24 : laparoscopic cholecystectomy with Dr. Munoz ([facility]) at [facility]. Pathology showed poorly differentiated adenocarcinoma of gallbladder, 2.4cm in size,  negative margins in cystic duct and liver side. pT2a with perimuscular and lymphovascular invasion noted.   -2/13/24: CT CAP - no evidence of metastatic disease.   -2/21/24 : Saw surgeon Dr. Tacho Harris at [facility], recommended repeat surgery with partial hepatic resection and portal lymphadenectomy.   -2/26/24 : Saw surgeon Dr. Silviano Hamilton at [facility], concurred with above.   -3/07/24 : underwent laparoscopic liver resection (of segment 4b/5) and portal lympadenectomy  -3/25/24 : visit with Med oncology to discuss adjuvant therapy   - 4/2024- 7/2024- adjuvant cape  - 7/24/2024- CT CAP with gastrohep LN, biopsy with cancer    Interval history:  With daughter  Feels ok    Past medical history:  GERD  Bilateral carpal tunnel syndrome    Past surgical history:  Laparoscopic Cholecystectomy (2/2/24)  Laparoscopic Liver resection (seg 4b/5) and portal lymphadenectomy (3/7/24)  Bladder suspension   cervical disc arthroplasty   hysterectomy   wisdom teeth extraction    Social history:   Currently lives independently in [place], [place]. Works at [employer] in [place], worked there for last 5 years. Jeet Smyth lives in [place], [place] (30min away from her) and involved in her care, also has health issues of her own. She also has two neices in [place] who check in on her. Loves to garden. 2 daughters, 4 grandkids,  Former smoker, 45 pack year history, currently smoking 1pack per day.       Family history:  EtOh use disorder in her birth father, birth mother, and brother;   Cancer -  father -throat cacner, uncle had colon cancer, maternal grandmother - throat cancer, and sister - lung cancer, another sister - stomach and colon cancer;   Dementia in her sister.     Physical examination:  Vital signs: /72 (BP Location: Right arm, Patient Position: Sitting, Cuff Size: Adult Regular)   Pulse 84   Temp 97.9  F (36.6  C) (Oral)   Resp 20   Wt 62.1 kg (136 lb 14.4 oz)   SpO2 97%   BMI 24.62 kg/m      ECOG performance  status:  1  Vascular access:  none    GENERAL: moderately nourished, sitting in chair, no acute distress.   HEENT: No icterus, no pallor.   LUNGS: Normal work of breathing.   CARDIOVASCULAR: Regular rate and rhythm  ABDOMEN: Soft, nontender  EXTREMITIES: No edema, decreased muscle mass   NEUROLOGIC: Alert and oriented; mild resting tremors in hands. No gait instability.       Lab and imaging data:  I personally reviewed the CT CAP from 7/2024 that demonstrated anika lesion and EUS biopsy from 8/6/2024 with adenocarcinoma.        Assessment and Plan:    Marie 63 yo woman from [place], [place].  Aggressive GB cancer s.p resection in early 2024.  Relapse in nodes while on adjuvant cape.  No actionable mutations.    Strong family history of cancer-- germline testing pending.    Today we discussed how this is stage 4 cancer, incurable.  We can try chemo. There are options.  Given early relapse while on chemo, chemo may not work great.  Her QOL is actually pretty good right now.  Daughter has read a lot, is well aware of how treatment can cause more burden than benefit.    She lives in [place]. Finances are an issue. Working 11 pm- 7 am daily. Fell behind on rent. Almost evicted.    Cancer treatment will also worsen all these social issues.    We discussed life expectancy without cancer treatment is likely many many months, but better idea could be given with repeat scans in 3-4 months to assess growth.    She has seen lots of cancer/ treatment in family, and does not want chemo.    She wants to focus on QOL.    We did discuss we did have chemo options, and her functional status and organ function is decent, so its not like we are not offering treatment.  This decision is more based on values/ goals.    Not really taking any meds.    Thus:  Pall care referral to establish care  SW referral for resources  Stop adjuvant cape  Labs and CT CAP in 4 months, see us back then- purely for prognostication help, can cancel if does not  want/ declines.  No more cancer treatment      I spent a total of 75 minutes on the date of service including preparation time (e.g., review of records and interpretation of tests), visit time with the patient and care partners, requesting interventions, communicating with other health care professionals, and documentation.    Albert Mayers M.D.   of Medicine  Division of Hematology, Oncology and Transplantation  [facility]

## 2024-08-12 NOTE — LETTER
8/12/2024      Deann Durand  [address]  [facility] [address]      Dear Colleague,    Thank you for referring your patient, Deann Durand, to the [facility]. Please see a copy of my visit note below.    [facility]  Medical Oncology Patient Visit Note    Patient name: Deann Durand  YOB: 1960    Oncologic History:  Diagnosis: Localized, resected (R0) gallbladder cancer (adenocarcinoma), negative (R0) margins, involvement of perimuscular adipose tissue on peritoneal side, poorly differentiated with lymphovascular invasion (LVI) present, 4/4 portal LN involved  Stage of cancer: [id number]     --> relapse in 7/2024 with anika met    Caris: Her2 IHC 0 (neg), JEAN CARLOS, TMB 2, no actionable alterations, NFE2L2 and TET2 mutations      Prior treatment(s):   - 1) laparoscopic cholecystectomy on 2/2/24  - 2) Laparoscopic re-exploration with resection of liver (segment 4b/5) and portal lymphadenectomy on 3/7/24  - 3) adjuvant capecitabine 4/2024- 7/2024    Current treatment(s):  Adjuvant systemic therapy with oral capecitabine x 6 months (planned)    Treatment intent:  Curative     Care Team  Medical oncologist: Albert Mayers MD ([facility])  Surgical oncologist:  Tacho Harris MD ([facility]) and Silviano Hamilton MD ([facility])  Other members of care team: Dr. Munoz (General Surgery at [facility]) , Domi Darden (PCP) at [facility]   Primary caregiver at home/ contact:  Libra Beard (daughter) at [phone]    Reason for consultation/ patient visit:  Consideration of adjuvant therapy for resected gallbladder cancer     History of presenting illness:  Ms. Deann Durand is a 63 year old female with recent diagnosis of gallbladder cancer (now resected x 2) who presents to establish medical oncology care. Her oncologic history (as obtained from chart review, patient interview) is summarized below  -    12/23 and 1/24 - presented with chronic, vague abdominal pain and  dyspepsia. Diagnosed with symptomatic cholelithiasis.   -2/2/24 : laparoscopic cholecystectomy with Dr. Munoz ([facility]) at [facility]. Pathology showed poorly differentiated adenocarcinoma of gallbladder, 2.4cm in size, negative margins in cystic duct and liver side. pT2a with perimuscular and lymphovascular invasion noted.   -2/13/24: CT CAP - no evidence of metastatic disease.   -2/21/24 : [place] surgeon Dr. Tacho Harris [place] [facility] recommended repeat surgery with partial hepatic resection and portal lymphadenectomy.   -2/26/24 : Saw surgeon Dr. Silviano Hamilton at [facility], concurred with above.   -3/07/24 : underwent laparoscopic liver resection (of segment 4b/5) and portal lympadenectomy  -3/25/24 : visit with Med oncology to discuss adjuvant therapy   - 4/2024- 7/2024- adjuvant cape  - 7/24/2024- CT CAP with gastrohep LN, biopsy with cancer    Interval history:  With daughter  Feels ok    Past medical history:  GERD  Bilateral carpal tunnel syndrome    Past surgical history:  Laparoscopic Cholecystectomy (2/2/24)  Laparoscopic Liver resection (seg 4b/5) and portal lymphadenectomy (3/7/24)  Bladder suspension   cervical disc arthroplasty   hysterectomy   wisdom teeth extraction    Social history:   Currently lives independently in [place], [place]. Works at [employer] in [place], worked there for last 5 years. Jeet Smyth lives in [place], [place] (30min away from her) and involved in her care, also has health issues of her own. She also has two neices in [place] who check in on her. Loves to garden. 2 daughters, 4 grandkids,  Former smoker, 45 pack year history, currently smoking 1pack per day.       Family history:  EtOh use disorder in her birth father, birth mother, and brother;   Cancer -  father -throat cacner, uncle had colon cancer, maternal grandmother - throat cancer, and sister - lung cancer, another sister - stomach and colon cancer;   Dementia in her sister.     Physical  examination:  Vital signs: /72 (BP Location: Right arm, Patient Position: Sitting, Cuff Size: Adult Regular)   Pulse 84   Temp 97.9  F (36.6  C) (Oral)   Resp 20   Wt 62.1 kg (136 lb 14.4 oz)   SpO2 97%   BMI 24.62 kg/m      ECOG performance status:  1  Vascular access:  none    GENERAL: moderately nourished, sitting in chair, no acute distress.   HEENT: No icterus, no pallor.   LUNGS: Normal work of breathing.   CARDIOVASCULAR: Regular rate and rhythm  ABDOMEN: Soft, nontender  EXTREMITIES: No edema, decreased muscle mass   NEUROLOGIC: Alert and oriented; mild resting tremors in hands. No gait instability.       Lab and imaging data:  I personally reviewed the CT CAP from 7/2024 that demonstrated anika lesion and EUS biopsy from 8/6/2024 with adenocarcinoma.        Assessment and Plan:    Marie 65 yo woman from [place], [place].  Aggressive GB cancer s.p resection in early 2024.  Relapse in nodes while on adjuvant cape.  No actionable mutations.    Strong family history of cancer-- germline testing pending.    Today we discussed how this is stage 4 cancer, incurable.  We can try chemo. There are options.  Given early relapse while on chemo, chemo may not work great.  Her QOL is actually pretty good right now.  Daughter has read a lot, is well aware of how treatment can cause more burden than benefit.    She lives in [place]. Finances are an issue. Working 11 pm- 7 am daily. Fell behind on rent. Almost evicted.    Cancer treatment will also worsen all these social issues.    We discussed life expectancy without cancer treatment is likely many many months, but better idea could be given with repeat scans in 3-4 months to assess growth.    She has seen lots of cancer/ treatment in family, and does not want chemo.    She wants to focus on QOL.    We did discuss we did have chemo options, and her functional status and organ function is decent, so its not like we are not offering treatment.  This decision is more  based on values/ goals.    Not really taking any meds.    Thus:  Pall care referral to establish care  SW referral for resources  Stop adjuvant cape  Labs and CT CAP in 4 months, see us back then- purely for prognostication help, can cancel if does not want/ declines.  No more cancer treatment      I spent a total of 75 minutes on the date of service including preparation time (e.g., review of records and interpretation of tests), visit time with the patient and care partners, requesting interventions, communicating with other health care professionals, and documentation.    Albert Mayers M.D.   of Medicine  Division of Hematology, Oncology and Transplantation  [facility]         Again, thank you for allowing me to participate in the care of your patient.        Sincerely,        Albert Mayers MD

## 2024-08-13 ENCOUNTER — PATIENT OUTREACH (OUTPATIENT)
Dept: CARE COORDINATION | Facility: CLINIC | Age: 64
End: 2024-08-13
Payer: COMMERCIAL

## 2024-08-13 NOTE — PROGRESS NOTES
"Social Work - Intervention  [facility]  Data/Intervention:  Dx metastatic gallbladder cancer follows [facility] Dr. Mayers.  Patient Name: Deann Durand Goes By: Sparkle ALLENB/Age: 1960 (64 year old)     Visit Type: telephone  Referral Source: Dr Mayers  Reason for Referral: Resources      Psychosocial Information/Concerns:  Asked if she had any concerns or resource needs and she declined.     Wondered about chemotherapy medication and how to return them. Deferred to pharmacy team - has the number to return the call.      Intervention/Education/Resources Provided:  None      Assessment/Plan:  Sparkle indicated she is \"taking it all in\". Not in active treatment at this time. Had no concerns or needs.      Provided patient/family with contact information and availability.    LIUDMILA Mendoza, [facility]   Adult Oncology - [place]  [phone]  Onsite [facility] on    *Please note does not work on .   Support Groups at [facility]: Social Work Services for Cancer Patients ([email or web address])   "

## 2024-08-21 ENCOUNTER — TELEPHONE (OUTPATIENT)
Dept: ONCOLOGY | Facility: CLINIC | Age: 64
End: 2024-08-21

## 2024-08-21 ENCOUNTER — VIRTUAL VISIT (OUTPATIENT)
Dept: ONCOLOGY | Facility: CLINIC | Age: 64
End: 2024-08-21
Attending: STUDENT IN AN ORGANIZED HEALTH CARE EDUCATION/TRAINING PROGRAM
Payer: COMMERCIAL

## 2024-08-21 DIAGNOSIS — Z80.0 FAMILY HISTORY OF MALIGNANT NEOPLASM OF GASTROINTESTINAL TRACT: ICD-10-CM

## 2024-08-21 DIAGNOSIS — C23 MALIGNANT NEOPLASM OF GALLBLADDER (H): Primary | ICD-10-CM

## 2024-08-21 PROCEDURE — 96040 HC GENETIC COUNSELING, EACH 30 MINUTES: CPT | Mod: TEL,95 | Performed by: GENETIC COUNSELOR, MS

## 2024-08-21 NOTE — PROGRESS NOTES
"8/21/2024    Virtual Visit Details  Type of service:  Telephone Visit   Phone call duration: 43 minutes   Originating Location (pt. Location): Other : parking lot in [place]  Distant Location (provider location):  Off-site    Referring Provider: Albert Mayers MD    Presenting Information:   Today Sparkle elected for a virtual genetic counseling visit through the Cancer Risk Management Program to discuss her personal and family history of cancer. We reviewed this history, cancer screening recommendations, and available genetic testing options.    Personal History:  Deann is a 64 year old female. 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 She is not currently pursuing active therapy.    She had her first menstrual period at age 14 and her first child at age 18. She went through menopause at age 40 when she had a complete hysterectomy to address fibroids. She reports that she very briefly used hormone replacement therapy (less than six months).      Her most recent mammogram in March 2021 was benign. Deann has not had a colonoscopy, but her most recent fecal immunochemical test (FIT) in May 2022 was normal/negative. She does not regularly do any other cancer screening at this time.    Family History: (Please see scanned pedigree for detailed family history information)  One maternal half brother may have had an unknown type of cancer and passed away at age 60.  His daughter had tumors \"on the outside of her body\" and passed away in her 40's. Sparkle was not aware of any specific diagnosis for this niece, or other notable symptoms (I.e. cafe au lait macules, large head size, learning disabilities, etc.).  One maternal half sister was diagnosed with a stomach/gastrointestinal cancer twice (thought to be two separate primaries) in her 50's and passed away.  One maternal half sister is in her 60's and has been diagnosed with lung cancer; " she has a history of smoking.  Sparkle's maternal uncle was diagnosed with colon cancer in his 60/70's and passed away.  Sparkle shared that she has limited information regarding her maternal aunt, but this aunt's grandson had a leukemia in childhood.  Sparkle's maternal grandmother was diagnosed with throat cancer and passed away at age 80; she did not have a history of tobacco use.  Sparkle's presumed biological father was diagnosed with throat cancer and passed away in his 50's; he likely had a history of tobacco use.  Sparkle shared that there is some question as to whether this individual is her biological father.  She also shared that she has no information regarding extended biological paternal relatives.  Her maternal ethnicity is /Scandinavian. Her paternal ethnicity is [place] or . There is no known Ashkenazi Tenriism ancestry on either side of her family.    Discussion:  We reviewed the features of sporadic, familial, and hereditary cancers. In looking at Deann's maternal family history, it is possible that a cancer susceptibility gene is present as Sparkle and several maternal relatives have been diagnosed with gastrointestinal cancers; her maternal half sister may have also had two primary cancers. It should also be noted that limited information is available regarding her biological paternal relatives. This may cause an underestimation of the chance for a paternally inherited cancer syndrome.  We discussed the natural history and genetics of hereditary gastrointestinal cancers, including Stern syndrome.   Stern syndrome can be caused by a mutation in one of five genes: MLH1, MSH2, MSH6, PMS2, and EPCAM. The highest cancer risks associated with Stern syndrome include colon, endometrial/uterine, gastric, and ovarian cancer. Other cancers have also been reported with Stern syndrome, including gallbladder/biliary tract cancers.   We discussed that there are additional genes that could cause  increased risk for the cancers in Sparkle's family. As many of these genes present with overlapping features in a family and accurate cancer risk cannot always be established based upon the pedigree analysis alone, it would be reasonable for Deann to consider panel genetic testing to analyze multiple genes at once.  Based on her personal and family history, Deann meets current National Comprehensive Cancer Network (NCCN) criteria for genetic testing of high penetrance gastrointestinal cancer genes (i.e. APC, BMPR1A, EPCAM, MUTYH, MLH1, MSH2, MSH6, PMS2, PTEN, SMAD4, STK11, TP53, etc.). Other genes have also been associated with gallbladder cancer risks, including the BRCA1, BRCA2, and BAP1 genes.  A detailed handout regarding these genes/syndromes and the information we discussed was provided to Deann at the end of our appointment today and can be found in the after visit summary. Topics included: inheritance pattern, cancer risks, cancer screening recommendations, and also risks, benefits and limitations of testing.  We reviewed genetic testing options for hereditary gastrointestinal and related cancers: targeted gastrointestinal cancers panel and expanded pan-cancer panels (Comprehensive Hereditary Cancer Panel or Expanded Panel). She opted for testing of the Stern syndrome genes, with reflex to the Comprehensive Hereditary Cancer Expanded Panel.  Consent was obtained over the phone and Sparkle elected to schedule a lab appointment at her earliest convenience. Once her blood is drawn, it will be sent to the [facility]. Turn around time: 4-6 weeks after her blood is drawn and insurance pre-authorization is obtained.  Medical Management: For Deann, we reviewed that the information from genetic testing may determine:  additional cancer screening for which Deann may qualify (i.e. mammogram and breast MRI, more frequent colonoscopies, more frequent dermatologic exams,  etc.),  options for risk reducing surgeries Deann could consider (i.e. bilateral mastectomy, etc.),    and targeted chemotherapies for Deann's active cancer, or if she were to develop certain cancers in the future (i.e. immunotherapy for individuals with Stern syndrome, PARP inhibitors, etc.).   These recommendations and possible targeted chemotherapies will be discussed in detail once genetic testing is completed.     Plan:  1) Today Deann elected to proceed with testing of the Stern syndrome genes, with reflex to the Comprehensive Hereditary Cancer Expanded Panel, through the Molecular Diagnostics Laboratory.  2) The results should be available 4-6 weeks after her blood is drawn and insurance pre-authorization is obtained.  3) Deann will be contacted to schedule a virtual return visit with me to discuss the results.    Faye Ewing MS, [facility]  Licensed, Certified Genetic Counselor  Office: [phone]  [email or web address]

## 2024-08-21 NOTE — LETTER
"8/21/2024      Denan Durand  [address]  [place] [address]      Dear Colleague,    Thank you for referring your patient, Deann Durand, to the [facility]. Please see a copy of my visit note below.    8/21/2024    Virtual Visit Details  Type of service:  Telephone Visit   Phone call duration: 43 minutes   Originating Location (pt. Location): Other : parking lot in [place]  Distant Location (provider location):  Off-site    Referring Provider: Albert Mayers MD    Presenting Information:   Today Sparkle elected for a virtual genetic counseling visit through the Cancer Risk Management Program to discuss her personal and family history of cancer. We reviewed this history, cancer screening recommendations, and available genetic testing options.    Personal History:  Deann is a 64 year old female. 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 She is not currently pursuing active therapy.    She had her first menstrual period at age 14 and her first child at age 18. She went through menopause at age 40 when she had a complete hysterectomy to address fibroids. She reports that she very briefly used hormone replacement therapy (less than six months).      Her most recent mammogram in March 2021 was benign. Deann has not had a colonoscopy, but her most recent fecal immunochemical test (FIT) in May 2022 was normal/negative. She does not regularly do any other cancer screening at this time.    Family History: (Please see scanned pedigree for detailed family history information)  One maternal half brother may have had an unknown type of cancer and passed away at age 60.  His daughter had tumors \"on the outside of her body\" and passed away in her 40's. Sparkle was not aware of any specific diagnosis for this niece, or other notable symptoms (I.e. cafe au lait macules, large head size, learning disabilities, " etc.).  One maternal half sister was diagnosed with a stomach/gastrointestinal cancer twice (thought to be two separate primaries) in her 50's and passed away.  One maternal half sister is in her 60's and has been diagnosed with lung cancer; she has a history of smoking.  Sparkle's maternal uncle was diagnosed with colon cancer in his 60/70's and passed away.  Sparkle shared that she has limited information regarding her maternal aunt, but this aunt's grandson had a leukemia in childhood.  Sparkle's maternal grandmother was diagnosed with throat cancer and passed away at age 80; she did not have a history of tobacco use.  Sparkle's presumed biological father was diagnosed with throat cancer and passed away in his 50's; he likely had a history of tobacco use.  Sparkle shared that there is some question as to whether this individual is her biological father.  She also shared that she has no information regarding extended biological paternal relatives.  Her maternal ethnicity is /Scandinavian. Her paternal ethnicity is Yoruba or . There is no known Ashkenazi Moravian ancestry on either side of her family.    Discussion:  We reviewed the features of sporadic, familial, and hereditary cancers. In looking at Deann's maternal family history, it is possible that a cancer susceptibility gene is present as Sparkle and several maternal relatives have been diagnosed with gastrointestinal cancers; her maternal half sister may have also had two primary cancers. It should also be noted that limited information is available regarding her biological paternal relatives. This may cause an underestimation of the chance for a paternally inherited cancer syndrome.  We discussed the natural history and genetics of hereditary gastrointestinal cancers, including Stern syndrome.   Stern syndrome can be caused by a mutation in one of five genes: MLH1, MSH2, MSH6, PMS2, and EPCAM. The highest cancer risks associated with Stern  syndrome include colon, endometrial/uterine, gastric, and ovarian cancer. Other cancers have also been reported with Stern syndrome, including gallbladder/biliary tract cancers.   We discussed that there are additional genes that could cause increased risk for the cancers in Sparkle's family. As many of these genes present with overlapping features in a family and accurate cancer risk cannot always be established based upon the pedigree analysis alone, it would be reasonable for Deann to consider panel genetic testing to analyze multiple genes at once.  Based on her personal and family history, Deann meets current National Comprehensive Cancer Network (NCCN) criteria for genetic testing of high penetrance gastrointestinal cancer genes (i.e. APC, BMPR1A, EPCAM, MUTYH, MLH1, MSH2, MSH6, PMS2, PTEN, SMAD4, STK11, TP53, etc.). Other genes have also been associated with gallbladder cancer risks, including the BRCA1, BRCA2, and BAP1 genes.  A detailed handout regarding these genes/syndromes and the information we discussed was provided to Deann at the end of our appointment today and can be found in the after visit summary. Topics included: inheritance pattern, cancer risks, cancer screening recommendations, and also risks, benefits and limitations of testing.  We reviewed genetic testing options for hereditary gastrointestinal and related cancers: targeted gastrointestinal cancers panel and expanded pan-cancer panels (Comprehensive Hereditary Cancer Panel or Expanded Panel). She opted for testing of the Stern syndrome genes, with reflex to the Comprehensive Hereditary Cancer Expanded Panel.  Consent was obtained over the phone and Sparkle elected to schedule a lab appointment at her earliest convenience. Once her blood is drawn, it will be sent to the [facility]. Turn around time: 4-6 weeks after her blood is drawn and insurance pre-authorization is obtained.  Medical Management:  For Deann, we reviewed that the information from genetic testing may determine:  additional cancer screening for which Deann may qualify (i.e. mammogram and breast MRI, more frequent colonoscopies, more frequent dermatologic exams, etc.),  options for risk reducing surgeries Deann could consider (i.e. bilateral mastectomy, etc.),    and targeted chemotherapies for Deann's active cancer, or if she were to develop certain cancers in the future (i.e. immunotherapy for individuals with Stern syndrome, PARP inhibitors, etc.).   These recommendations and possible targeted chemotherapies will be discussed in detail once genetic testing is completed.     Plan:  1) Today Deann elected to proceed with testing of the Stern syndrome genes, with reflex to the Comprehensive Hereditary Cancer Expanded Panel, through the Molecular Diagnostics Laboratory.  2) The results should be available 4-6 weeks after her blood is drawn and insurance pre-authorization is obtained.  3) Deann will be contacted to schedule a virtual return visit with me to discuss the results.    Faye Ewing MS, [facility]  Licensed, Certified Genetic Counselor  Office: [phone]  [email or web address]      Again, thank you for allowing me to participate in the care of your patient.        Sincerely,        Faye Ewing GC

## 2024-08-21 NOTE — NURSING NOTE
Current patient location: [facility] [address]  [place] [address]    Is the [place] currently in the state of [place]    Visit mode:TELEPHONE    If the visit is dropped, the patient can be reconnected by: TELEPHONE VISIT: Phone number:   Telephone Information:   Mobile [phone]       Will anyone else be joining the visit? NO  (If patient encounters technical issues they should call [phone])    How would you like to obtain your AVS? Mail a copy    Are changes needed to the allergy or medication list? N/A    Are refills needed on medications prescribed by this physician? NO    Rooming Documentation:  Questionnaire(s) not done per department protocol    PT was home in [place] at check in - PT is driving to [place] side of border for appt.    Reason for visit: Consult    Esha ENGLE    
vomiting

## 2024-08-21 NOTE — TELEPHONE ENCOUNTER
STD forms received via FAX from [employer].      Forms to be completed and put in folder for provider to approve.    Fax #:  [phone]  Claim: [id number]    Danelle Harris

## 2024-08-23 NOTE — PATIENT INSTRUCTIONS
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    These families often have:  Several people with the same or related types of cancer  Cancers diagnosed at a young age (before age 50)  Individuals with more than one primary cancer  Multiple generations of the family affected with cancer    Genetic Testing  Genetic testing involves a blood or saliva test and will look at the genetic information in select genes for any harmful mutations that are associated with increased cancer risk.  If possible, it is recommended that the person(s) who has had cancer be tested before other family members.  That person will give us the most useful information about whether or not a specific gene is associated with the cancer in the family.     Results  There are three possible results of genetic testing:  Positive--a harmful mutation was identified  Negative--no mutation was identified  Variant of unknown significance--a variation in one of the genes was identified, but it is unclear how this impacts cancer risk in the family    Advantages and Disadvantages  There are advantages and disadvantages to genetic testing.    Advantages  May clarify your cancer risk  Can help you make medical decisions  May explain the cancers in your family  May give useful information to your family members (if you share your results)    Disadvantages  Possible negative emotional impact of learning about inherited cancer risk  Uncertainty in interpreting a negative test result in some situations  Possible genetic discrimination concerns (see below)    Inheritance   Mutations in most cancer risk genes are inherited in an autosomal dominant pattern.  This means that if a parent has a mutation, each of their children will have a 50% chance of inheriting that same mutation.  Therefore, each child would have a 50% chance of being at increased  risk for developing cancer.                                              Image obtained from Genetics Home Reference, 2013     Genetic Information Nondiscrimination Act (ANÍBAL)  ANÍBAL is a federal law that protects individuals from health insurance or employment discrimination based on a genetic test result alone.  Although rare, there are currently no legal protections in terms of life insurance, long term care, or disability insurances.  Visit the National Human Genome Research [place] at Genome.gov/35347839 to learn more.    Reducing Cancer Risk  If a harmful mutation is found in a cancer risk gene, there may be certain screening tests or preventative surgeries that can be offered. This information will be discussed after genetic testing is completed. If no mutations are found on genetic testing, screening is then recommended based on personal and/or family history of cancer.     Questions to Think About Regarding Genetic Testing  What effect will the test result have on me and my relationship with my family members if I have an inherited gene mutation?  If I don t have a gene mutation?  Should I share my test results, and how will my family react to this news, which may also affect them?  Are my children ready to learn new information that may one day affect their own health?    Resources  American Cancer Society (ACS) cancer.org   National Cancer [place] (NCI) cancer.gov     Please call us if you have any questions or concerns.   Cancer Risk Management Program [phone] ([phone])  Joel Hughes, MS [facility] [phone]  Antoinette Weeks, MS, [facility] [phone]  Lily Manuel, MS, [facility]  [phone]  Maxine Leiva, MS, [facility]  [phone]  Jadyn Segovia, MS, [facility]  [phone]  Faye Ewing, MS, [facility] [phone]  Kaylee Moore, MS, [facility] [phone]

## 2024-08-28 ENCOUNTER — LAB (OUTPATIENT)
Dept: LAB | Facility: HOSPITAL | Age: 64
End: 2024-08-28
Payer: COMMERCIAL

## 2024-08-28 DIAGNOSIS — C23 MALIGNANT NEOPLASM OF GALLBLADDER (H): ICD-10-CM

## 2024-08-28 DIAGNOSIS — Z80.0 FAMILY HISTORY OF MALIGNANT NEOPLASM OF GASTROINTESTINAL TRACT: ICD-10-CM

## 2024-08-28 LAB
ALBUMIN SERPL BCG-MCNC: 4.4 G/DL (ref 3.5–5.2)
ALP SERPL-CCNC: 246 U/L (ref 40–150)
ALT SERPL W P-5'-P-CCNC: 18 U/L (ref 0–50)
ANION GAP SERPL CALCULATED.3IONS-SCNC: 14 MMOL/L (ref 7–15)
AST SERPL W P-5'-P-CCNC: 23 U/L (ref 0–45)
BASOPHILS # BLD AUTO: 0.1 10E3/UL (ref 0–0.2)
BASOPHILS NFR BLD AUTO: 1 %
BILIRUB SERPL-MCNC: 1 MG/DL
BUN SERPL-MCNC: 18.3 MG/DL (ref 8–23)
CALCIUM SERPL-MCNC: 9.2 MG/DL (ref 8.8–10.4)
CHLORIDE SERPL-SCNC: 105 MMOL/L (ref 98–107)
CREAT SERPL-MCNC: 0.72 MG/DL (ref 0.51–0.95)
EGFRCR SERPLBLD CKD-EPI 2021: >90 ML/MIN/1.73M2
EOSINOPHIL # BLD AUTO: 0.4 10E3/UL (ref 0–0.7)
EOSINOPHIL NFR BLD AUTO: 5 %
ERYTHROCYTE [DISTWIDTH] IN BLOOD BY AUTOMATED COUNT: 14.6 % (ref 10–15)
GLUCOSE SERPL-MCNC: 108 MG/DL (ref 70–99)
HCO3 SERPL-SCNC: 23 MMOL/L (ref 22–29)
HCT VFR BLD AUTO: 43.9 % (ref 35–47)
HGB BLD-MCNC: 14.5 G/DL (ref 11.7–15.7)
IMM GRANULOCYTES # BLD: 0 10E3/UL
IMM GRANULOCYTES NFR BLD: 0 %
INTERPRETATION: NORMAL
INTERPRETATION: NORMAL
LAB PDF RESULT: NORMAL
LAB PDF RESULT: NORMAL
LYMPHOCYTES # BLD AUTO: 2.2 10E3/UL (ref 0.8–5.3)
LYMPHOCYTES NFR BLD AUTO: 31 %
MCH RBC QN AUTO: 32 PG (ref 26.5–33)
MCHC RBC AUTO-ENTMCNC: 33 G/DL (ref 31.5–36.5)
MCV RBC AUTO: 97 FL (ref 78–100)
MONOCYTES # BLD AUTO: 0.8 10E3/UL (ref 0–1.3)
MONOCYTES NFR BLD AUTO: 11 %
NEUTROPHILS # BLD AUTO: 3.6 10E3/UL (ref 1.6–8.3)
NEUTROPHILS NFR BLD AUTO: 52 %
NRBC # BLD AUTO: 0 10E3/UL
NRBC BLD AUTO-RTO: 0 /100
PLATELET # BLD AUTO: 237 10E3/UL (ref 150–450)
POTASSIUM SERPL-SCNC: 3.6 MMOL/L (ref 3.4–5.3)
PROT SERPL-MCNC: 7.5 G/DL (ref 6.4–8.3)
RBC # BLD AUTO: 4.53 10E6/UL (ref 3.8–5.2)
SIGNIFICANT RESULTS: NORMAL
SIGNIFICANT RESULTS: NORMAL
SODIUM SERPL-SCNC: 142 MMOL/L (ref 135–145)
SPECIMEN DESCRIPTION: NORMAL
SPECIMEN DESCRIPTION: NORMAL
TEST DETAILS, MDL: NORMAL
TEST DETAILS, MDL: NORMAL
WBC # BLD AUTO: 7.1 10E3/UL (ref 4–11)

## 2024-08-28 PROCEDURE — 86301 IMMUNOASSAY TUMOR CA 19-9: CPT

## 2024-08-28 PROCEDURE — 36415 COLL VENOUS BLD VENIPUNCTURE: CPT

## 2024-08-28 PROCEDURE — 80053 COMPREHEN METABOLIC PANEL: CPT

## 2024-08-28 PROCEDURE — 85041 AUTOMATED RBC COUNT: CPT

## 2024-08-28 NOTE — PROGRESS NOTES
Addended by: DAVID COX on: 8/28/2024 10:28 AM     Modules accepted: Orders     [facility]  Medical Oncology Initial Patient Visit Note    Patient name: Deann Durand  YOB: 1960  Visit date: 4/1//2024    Oncologic History:  Diagnosis: Localized, resected (R0) gallbladder cancer (adenocarcinoma), negative (R0) margins, involvement of perimuscular adipose tissue on peritoneal side, poorly differentiated with lymphovascular invasion (LVI) present, 4/4 portal LN involved  Stage of cancer: [id number]     Caris: Her2 IHC 0 (neg), JEAN CARLOS, TMB 2, no actionable alterations, NFE2L2 and TET2 mutations      Prior treatment(s):   - 1) laparoscopic cholecystectomy on 2/2/24  - 2) Laparoscopic re-exploration with resection of liver (segment 4b/5) and portal lymphadenectomy on 3/7/24    Current treatment(s):  Adjuvant systemic therapy with oral capecitabine x 6 months (planned)    Treatment intent:  Curative     Care Team  Medical oncologist: Albert Mayers MD ([facility])  Surgical oncologist:  Tacho Harris MD ([facility]) and Silviano Hamilton MD ([facility])  Other members of care team: Dr. Munoz (General Surgery at [facility]) , Domi Darden (PCP) at [facility]   Primary caregiver at home/ contact:  Libra Beard (daughter) at [phone]    Reason for consultation/ patient visit:  Consideration of adjuvant therapy for resected gallbladder cancer     History of presenting illness:  Ms. Deann Durand is a 63 year old female with recent diagnosis of gallbladder cancer (now resected x 2) who presents to establish medical oncology care. Her oncologic history (as obtained from chart review, patient interview) is summarized below  -    12/23 and 1/24 - presented with chronic, vague abdominal pain and dyspepsia. Diagnosed with symptomatic cholelithiasis.   -2/2/24 : laparoscopic cholecystectomy with Dr. Munoz ([facility]) at [facility]. Pathology showed poorly differentiated adenocarcinoma of gallbladder, 2.4cm in size, negative margins in cystic duct and liver side. pT2a  with perimuscular and lymphovascular invasion noted.   -2/13/24: CT CAP - no evidence of metastatic disease.   -2/21/24 : Saw [facility]. Tacho Harris [facility], recommended repeat surgery with partial hepatic resection and portal lymphadenectomy.   -2/26/24 : Saw surgeon Dr. Silviano Hamilton at [facility], concurred with above.   -3/07/24 : underwent laparoscopic liver resection (of segment 4b/5) and portal lympadenectomy  -3/25/24 : visit with Med oncology to discuss adjuvant therapy     Interval history:  She is here today with her daughter Libra. Overall, she states she has recovered reasonably well from surgery and had a post-op check-up with Dr. Harris last week which went well. She reports only mild persistent pain at surgical site which is slowly resolving. She reports good appetite and po intake. Normal Bowel movements. She is walking around her house normally, and no issues with any ADL's. She is currently on short-term disability and planning to return to work on 4/27/24.     Past medical history:  GERD  Bilateral carpal tunnel syndrome    Past surgical history:  Laparoscopic Cholecystectomy (2/2/24)  Laparoscopic Liver resection (seg 4b/5) and portal lymphadenectomy (3/7/24)  Bladder suspension   cervical disc arthroplasty   hysterectomy   wisdom teeth extraction    Social history:   Currently lives independently in [place], [place]. Works at [employer] in [place], worked there for last 5 years. Jeet Smyth lives in [place], [place] (30min away from her) and involved in her care, also has health issues of her own. She also has two neices in [place] who check in on her. Loves to garden. 2 daughters, 4 grandkids,  Former smoker, 45 pack year history, currently smoking 1pack per day.       Family history:  EtOh use disorder in her birth father, birth mother, and brother;   Cancer -  father -throat cacner, uncle had colon cancer, maternal grandmother - throat cancer, and sister - lung cancer, another sister - stomach  "and colon cancer;   Dementia in her sister.     Allergies:   Allergies   Allergen Reactions    Morphine Nausea and Vomiting       Outpatient medications:     Current Outpatient Medications:     escitalopram (LEXAPRO) 10 MG tablet, Take 1 tablet by mouth daily at 2 pm, Disp: , Rfl:     MULTIPLE VITAMINS-MINERALS PO, , Disp: , Rfl:     omeprazole (PRILOSEC) 20 MG DR capsule, Take 20 mg by mouth, Disp: , Rfl:     traZODone (DESYREL) 50 MG tablet, Take 50 mg by mouth at bedtime, Disp: , Rfl:     Physical examination:  Vital signs: /82 (BP Location: Right arm, Patient Position: Sitting, Cuff Size: Adult Small)   Pulse 99   Temp 97.4  F (36.3  C) (Oral)   Resp 16   Ht 1.588 m (5' 2.5\")   Wt 61.2 kg (135 lb)   SpO2 97%   BMI 24.30 kg/m      ECOG performance status:  1  Vascular access:  none    GENERAL: moderately nourished, sitting in chair, no acute distress.   HEENT: No icterus, no pallor.   LUNGS: Normal work of breathing.   CARDIOVASCULAR: Regular rate and rhythm  ABDOMEN: Soft, nontender  EXTREMITIES: No edema, decreased muscle mass   NEUROLOGIC: Alert and oriented; mild resting tremors in hands. No gait instability.       Lab data:  I have personally reviewed the following lab data which are notable for    CBC shows mild, normocytic anemia with Hgb 11.1, metabolic panel without evidence of renal dysfunction (creat low normal), liver panel showing hypoalbuminemia (Alb 3.1), mild elevation in ALP (128), AST (58) and ALT (92) and normal bilirubin (0.8, direct 0.2)      Radiology data:  I have personally reviewed the images of / or the following radiology data which are notable for      CT CAP (2/15/24)  IMPRESSION:   1. A 2.5 cm fluid collection the gallbladder fossa. While this most likely represents a postsurgical seroma, the differential diagnosis includes abscess and biloma.   2.  No evidence of metastatic disease in the chest, abdomen or pelvis.    Pathology and other data:  I have personally reviewed " and interpreted the following data notable for      Gall bladder cholecystectomy specimen:(in-house N path consult report)   CASE FROM [place], [place] ([id number], OBTAINED 02/02/24):  Gallbladder, cholecystectomy:  - Adenocarcinoma, biliary type (2.4 cm)  - Tumor invades the perimuscular adipose tissue on the peritoneal side, without involvement of the serosa (pT2a)  - Lymphatic vascular invasion present  - Surgical margins are negative for malignancy or dysplasia    Liver Resection (IVb and V) and portal lymphadenectomy (3/7/24) specimen - from [facility] via Care Everywhere:  Specimen number:  [id number]   Final Diagnosis:   A. Liver, segment 4a nodule, wedge biopsy - Fibrous tissue and benign bile ducts, negative for   malignancy.     B. Lymphadenectomy, abdomen, dissection - Four lymph nodes positive for metastatic carcinoma   (4/4) and one fragment of benign adipose tissue.     C. Cystic duct stump, abdomen, biopsy - Cystic duct with surrounding fibroadipose and nerve   tissue. Negative for dysplasia and malignancy.     Liver, 4b 5, partial resection - Liver with prior procedure-related change. Negative for   malignancy.      Assessment and Plan:  Ms. Deann Durand is a 63 year old female with prior history of chronic smoking who presented initially with dyspepsia and abdominal pain, underwent cholecystectomy for what was presumed to be symptomatic cholelithiasis and was diagnosed with gallbladder cancer after it was incidentally discovered on histopathological analysis of cholecystectomy specimen. She underwent repeat surgery with resection of liver bed(seg 4b and 5) and portal lymphadenectomy with evidence of clear margins but involvement of portal LN. She is here today to discuss adjuvant therapy for her resected gallbladder cancer.     We discussed her clinical course, extent of cancer and staging (see above). We discussed she has received standard of care surgical resection with removal of  gallbladder as well as adjacent liver segments and portal LN's. We reviewed the high risk of recurrence with GB cancer in general and in particular in her case with risk factors such as high T-stage (tumor invading perimuscular connective tissue), lymphovascular invasion and involvement of >3 portal LN's.     We discussed the benefits and risks of adjuvant therapy with oral capecitabine for a total duration of 6 months. We reviewed the results of the BILCAP trial (Lancet, 2019) with results showing relapse-free survival of 25mos vs 18mos ([facility] observation alone), median OS 51months vs 36mos both of which is clinically meaningful and significant; NCCN guidelines recommend (level 1 evidence) consideration of adjuvant chemotherapy in this setting. We discussed common and uncommon side effects of this chemotherapy including but not limited to nausea, vomiting, mucositis, diarrhea, hand-foot syndrome.     We discussed the alternate adjuvant approach of combined chemotherapy with radiotherapy. There has been no direct head-to-head comparison of these two modalities in this setting. It has been established there is a significant overall survival benefit to adjuvant chemoRT and a paper examining a prediction model based on SEER-database data, appeared to suggest some superiority in median overall survival for chemoRT vs chemotherapy (Santosh et al, JCO 2011). [https://dmice.[facility].edu/nomograms/gastrointestinal/gallbladder.php.] Currently, ASCO recommends adjuvant chemoRT only in microscopically positive margins, NCCN recommends for patients with either margin-positive or node-positive disease (category 2B).     She was agreeable to starting oral chemotherapy.    We will plan to obtain baseline CT CAP prior to chemotherapy and repeat interim restaging scan at 3 months and at 6 months. We will also obtain tumor-specific genomic MRD assay at baseline, followed by every 3 months; We will obtain baseline serum tumor markers - CA  19-9 and CEA.       Plan:  - obtain baseline imaging with CT CAP with and without contrast  - obtain somatic NGS  (CARIS)   - Start adjuvant therapy with oral capecitabine at dose of 1500mg twice daily (q12h) on d1-14 of 21-day cycle, planned total 8 cycles.   - repeat surveillance CT CAP maybe q3 months  - refer to Genetic Counselor for germline genetic testing given strong family history of cancers     The patient and family asked numerous excellent questions which we answered to the best of our abilities. At the completion of our consultation, the patient and accompanying caregivers had an excellent understanding of the plan. They have our contact information for any further questions or concerns and of course, as always, we are available in the interim.       The patient was seen with and the above assessment and plan was discussed with staff physician Dr. Albert Mayers MD who agreed with the same.    Emeka Cooney   PGY-6 Fellow, Hematology,Oncology and BMT  [facility]     ----------------------    Physician Attestation    I, Albert Mayers, saw and evaluated Deann Durand in-person and agree with the resident/ fellow documentation by Dr. Cooney.    I have reviewed and discussed with them the history, physical exam, and plan.  I personally reviewed the vital signs, interval events, medications, labs and imaging.      I personally performed the substantive portion of the medical decision making for this visit - please see the full documentation for full details.      Key management decisions made by me and carried out under my direction:   Marie 64 yo woman with smoking/ emphysema with initially incidental gallbladder cancer, found to have pT2N2 disease with several high risk features (multiple LN +, PNI, LVI, poorly differentiated) on final path.    She is recovering well from surgery.    We met today with daughter and discussed role of adjuvant therapy.    We discussed BILCAP trial and role  of capecitabine.  We discussed role of radiation, and how it could add benefit, but also visits.    They decided to pursue adjuvant capecitabine.    We will send Nicky NGS on surgical sample from 3/7/24.    We will refer for germline testing given strong family history of cancer.    Repeat labs and CT CAP in next 1-2 weeks.  Start cape 1000 mg bid (D1-14, q21 days) with REJI visit around C1D1.  Starting lower dose given her low weight.  Next cycles (virtual vs. in person, where to do labs prior etc) TBD at that first REJI visit. Her wifi may be weak in house, daughter lives in [place] etc.    Counseled re: smoking but no current plans to quit.         I spent a total of 90 minutes on the date of service including preparation time (e.g., review of records and interpretation of tests), visit time with the patient and care partners, requesting interventions, communicating with other health care professionals, and documentation.      Date of Service (when I saw the patient): 04/01/24    Albert Mayers M.D.   of Medicine  Division of Hematology, Oncology and Transplantation  [facility]

## 2024-08-30 LAB — CANCER AG19-9 SERPL IA-ACNC: 304 U/ML

## 2024-08-30 NOTE — TELEPHONE ENCOUNTER
std paperwork completed, checked for accuracy, signed and faxed to [email or web address]@ [phone]. A copy was made, sent to scanning and original mailed to patient at home address.    Successful transmission verified in Right Fax.    Danelle Harris

## 2024-09-04 ENCOUNTER — PATIENT OUTREACH (OUTPATIENT)
Dept: ONCOLOGY | Facility: CLINIC | Age: 64
End: 2024-09-04
Payer: COMMERCIAL

## 2024-09-04 NOTE — PROGRESS NOTES
[facility]: Cancer Care Short Note                                                                                          Incoming Call:   RNCC received voicemail from Sparkle.  She has paperwork from [employer].  Unable to reach Sparkle when calling back.  Left her a voicemail with return number so that we can assist with her paperwork.      9/5 update - Sparkle clarified that she needs a brief letter from Dr. Mayers documenting that her diagnosis is terminal.  Letter written by Dr. Mayers and mailed to Sparkle today.    Lucille Ravi RN, BSN  RN Care Coordinator   [facility]

## 2024-09-13 ENCOUNTER — LAB (OUTPATIENT)
Dept: LAB | Facility: CLINIC | Age: 64
End: 2024-09-13
Payer: COMMERCIAL

## 2024-09-13 ENCOUNTER — DOCUMENTATION ONLY (OUTPATIENT)
Dept: LAB | Facility: CLINIC | Age: 64
End: 2024-09-13
Payer: COMMERCIAL

## 2024-09-13 DIAGNOSIS — Z80.0 FAMILY HISTORY OF MALIGNANT NEOPLASM OF GASTROINTESTINAL TRACT: ICD-10-CM

## 2024-09-13 DIAGNOSIS — C23 MALIGNANT NEOPLASM OF GALLBLADDER (H): Primary | ICD-10-CM

## 2024-09-13 PROCEDURE — G0452 MOLECULAR PATHOLOGY INTERPR: HCPCS | Mod: 26 | Performed by: PATHOLOGY

## 2024-09-13 NOTE — PROGRESS NOTES
Prior authorization for Sparkle Durand's genetic testing is approved. eOOP <$300 so MDL will proceed with testing. We will reach out with results when they are available in 4-6 weeks.      Júnior Paredes  Genomics Billing    [facility]  Molecular Diagnostics Laboratory  [address] [address]  [email or web address]  Office: [phone]  Fax:   Employed by [employer] [facility]

## 2024-09-17 LAB — INTERPRETATION: NORMAL

## 2024-09-24 DIAGNOSIS — C23 MALIGNANT NEOPLASM OF GALLBLADDER (H): Primary | ICD-10-CM

## 2024-09-24 DIAGNOSIS — Z80.0 FAMILY HISTORY OF MALIGNANT NEOPLASM OF GASTROINTESTINAL TRACT: ICD-10-CM

## 2024-09-25 ENCOUNTER — VIRTUAL VISIT (OUTPATIENT)
Dept: ONCOLOGY | Facility: CLINIC | Age: 64
End: 2024-09-25
Attending: GENETIC COUNSELOR, MS
Payer: COMMERCIAL

## 2024-09-25 DIAGNOSIS — C23 MALIGNANT NEOPLASM OF GALLBLADDER (H): ICD-10-CM

## 2024-09-25 DIAGNOSIS — Z80.0 FAMILY HISTORY OF MALIGNANT NEOPLASM OF GASTROINTESTINAL TRACT: ICD-10-CM

## 2024-09-25 PROCEDURE — 999N000069 HC STATISTIC GENETIC COUNSELING, < 16 MIN: Mod: TEL,95 | Performed by: GENETIC COUNSELOR, MS

## 2024-09-25 NOTE — NURSING NOTE
Current patient location:  [address] [place] [address]    Is the patient currently in the state of [place]? YES    Visit mode:TELEPHONE    If the visit is dropped, the patient can be reconnected by: TELEPHONE VISIT: Phone number:   Telephone Information:   Mobile [phone]       Will anyone else be joining the visit? NO  (If patient encounters technical issues they should call [phone])    How would you like to obtain your AVS? MyChart    Are changes needed to the allergy or medication list? NO    Are refills needed on medications prescribed by this physician? NO    Rooming Documentation:  Not applicable    Reason for visit: ANGELLA ENGLE

## 2024-09-25 NOTE — LETTER
"9/25/2024      Deann Durand  [address]  [facility] [address]      Dear Colleague,    Thank you for referring your patient, Deann Durand, to the [facility]. Please see a copy of my visit note below.    9/25/2024    Virtual Visit Details  Type of service:  Telephone Visit   Phone call duration: 11 minutes   Originating Location (pt. Location): Home  Distant Location (provider location):  Off-site    Referring Provider: Albert Mayesr MD    Presenting Information:  I spoke to Deann by phone today to discuss her genetic testing results. We last met on 8/21/2024 and her blood was drawn on 8/28/2024. The Comprehensive Hereditary Cancer Expanded Panel was ordered from the [facility]. This testing was done because of eDann's personal and family history of cancer.    Genetic Testing Result: NEGATIVE  Deann is negative for mutations in the ALK, APC, JACK, AXIN2, BAP1, BARD1, BLM, BMPR1A, BRCA1, BRCA2, BRIP1, CASR, CDC73, CDH1, CDK4, CDKN1B, CDKN1C, CDKN2A, CEBPA, CHEK2, CTNNA1, DICER1, DIS3L2, EGFR, EPCAM, FH, FLCN, GATA2, GPC3, GREM1, HOXB13, HRAS, KIT, MAX, MBD4, MEN1, MET, MITF, MLH1, MLH3, MRE11, MSH2, MSH3, MSH6, MUTYH, NBN, NF1, NF2, NTHL1, PALB2, PDGFRA, PHOX2B, PMS2, POLD1, POLE, POT1, VVPBW8O, PTCH1, PTCH2, PTEN, RAD50, RAD51C, RAD51D, RB1, RECQL4, RET, RUNX1, SDHA, SDHAF2, SDHB, SDHC, SDHD, SMAD4, SMARCA4, SMARCB1, SMARCE1, STK11, SUFU, TERC, TERT, RIIB259, TP53, TSC1, TSC2, VHL, WRN, and WT1 genes.      No mutations were found in any of the genes analyzed. This test involved sequencing and deletion/duplication analysis of all genes with the exceptions of EPCAM and GREM1 (deletions/duplications only).      A copy of the test report can be found in the Laboratory tab, dated 9/13/2024, and named \"Next generation sequencing\".     Interpretation:  We discussed several different interpretations of this negative test result.    One explanation may " be that there is a different gene or combination of genes and environment that are associated with the cancers in this family that are not identifiable using this genetic test. As such, Sparkle and her family are encouraged to contact me regularly to review any new genetic testing options that may be appropriate for them.  Another explanation may be that her other relatives with cancer, such as her maternal half sister that had two primary gastrointestinal cancers, did have a mutation in one of these genes and Sparkle did not inherit it. If that is the case, Sparkle's gallbladder cancer may be sporadic cancer caused by random cellular changes.  There is also a small possibility that there is a mutation in one of these genes, and the testing laboratory could not find it with their current testing methods.       Cancer Screening:  Based on this negative test result, it is important for Deann and her relatives to refer back to the family history for appropriate cancer screening.    Sparkle should continue to follow all cancer care as recommended by her medical providers.  Based on the family history, Sparkle's close relatives (such as her children) are likely at some increased risk for gallbladder cancer. Though there are no formal gallbladder cancer screening recommendations based on family history alone, Sparkle's children are encouraged to share the family history and any concerning symptoms with their medical providers. Their medical providers may have individualized recommendations.  Other population cancer screening options, such as those recommended by the American Cancer Society and the National Comprehensive Cancer Network (NCCN), are also appropriate for Deann and her family. These screening recommendations may change if there are changes to Deann's personal and/or family history of cancer. Final screening recommendations should be made in consultation with each individual's primary care provider.       Inheritance:  We reviewed the autosomal dominant inheritance of mutations in these 87 genes. We discussed that Deann did not pass on an identifiable mutation in these genes to her children based on this test result. Mutations in these genes do not skip generations.      Additional Testing Considerations:  Although Deann's genetic testing result was negative, other relatives may still carry a gene mutation associated with hereditary cancer. This means it would be reasonable for Sparkle's other relatives with cancer (or their closet relatives, like children) to consider meeting with a genetic counselor to discuss their genetic testing options. If any of these relatives do pursue genetic testing, Deann is encouraged to contact me so that we may review the impact of their test results on her.    Summary:  We do not have an explanation for Deann's personal or family history of cancer. While no genetic changes were identified, Deann may still be at risk for certain cancers due to family history, environmental factors, or other genetic causes not identified by this test. Because of that, it is important that she continue with cancer screening based on her personal and family history as discussed above.    Genetic testing is rapidly advancing, and new cancer susceptibility genes will most likely be identified in the future. Therefore, I encouraged Deann to contact me regularly or if there are changes in her personal or family history. This may change how we assess her cancer risk, screening, and the testing we would offer.    Plan:  1. Deann was provided a copy of her test results via [employer].  2. She plans to follow-up with her medical providers, as needed.  3. Sparkle and her relatives should contact me periodically, or if their personal or family history of cancer changes.    Faye Ewing MS, [facility]  Licensed, Certified Genetic Counselor  Office: [phone]  [email or web address]      Again, thank you for  allowing me to participate in the care of your patient.        Sincerely,        Faye Ewing, GC

## 2024-09-25 NOTE — PROGRESS NOTES
"9/25/2024    Virtual Visit Details  Type of service:  Telephone Visit   Phone call duration: 11 minutes   Originating Location (pt. Location): Home  Distant Location (provider location):  Off-site    Referring Provider: Albert Mayers MD    Presenting Information:  I spoke to Deann by phone today to discuss her genetic testing results. We last met on 8/21/2024 and her blood was drawn on 8/28/2024. The Comprehensive Hereditary Cancer Expanded Panel was ordered from the [facility]. This testing was done because of Deann's personal and family history of cancer.    Genetic Testing Result: NEGATIVE  Deann is negative for mutations in the ALK, APC, JACK, AXIN2, BAP1, BARD1, BLM, BMPR1A, BRCA1, BRCA2, BRIP1, CASR, CDC73, CDH1, CDK4, CDKN1B, CDKN1C, CDKN2A, CEBPA, CHEK2, CTNNA1, DICER1, DIS3L2, EGFR, EPCAM, FH, FLCN, GATA2, GPC3, GREM1, HOXB13, HRAS, KIT, MAX, MBD4, MEN1, MET, MITF, MLH1, MLH3, MRE11, MSH2, MSH3, MSH6, MUTYH, NBN, NF1, NF2, NTHL1, PALB2, PDGFRA, PHOX2B, PMS2, POLD1, POLE, POT1, NUKHR4L, PTCH1, PTCH2, PTEN, RAD50, RAD51C, RAD51D, RB1, RECQL4, RET, RUNX1, SDHA, SDHAF2, SDHB, SDHC, SDHD, SMAD4, SMARCA4, SMARCB1, SMARCE1, STK11, SUFU, TERC, TERT, PHRV713, TP53, TSC1, TSC2, VHL, WRN, and WT1 genes.      No mutations were found in any of the genes analyzed. This test involved sequencing and deletion/duplication analysis of all genes with the exceptions of EPCAM and GREM1 (deletions/duplications only).      A copy of the test report can be found in the Laboratory tab, dated 9/13/2024, and named \"Next generation sequencing\".     Interpretation:  We discussed several different interpretations of this negative test result.    One explanation may be that there is a different gene or combination of genes and environment that are associated with the cancers in this family that are not identifiable using this genetic test. As such, Sparkle and her family are encouraged to contact me " regularly to review any new genetic testing options that may be appropriate for them.  Another explanation may be that her other relatives with cancer, such as her maternal half sister that had two primary gastrointestinal cancers, did have a mutation in one of these genes and Sparkle did not inherit it. If that is the case, Sparkle's gallbladder cancer may be sporadic cancer caused by random cellular changes.  There is also a small possibility that there is a mutation in one of these genes, and the testing laboratory could not find it with their current testing methods.       Cancer Screening:  Based on this negative test result, it is important for Deann and her relatives to refer back to the family history for appropriate cancer screening.    Sparkle should continue to follow all cancer care as recommended by her medical providers.  Based on the family history, Sparkle's close relatives (such as her children) are likely at some increased risk for gallbladder cancer. Though there are no formal gallbladder cancer screening recommendations based on family history alone, Sparkle's children are encouraged to share the family history and any concerning symptoms with their medical providers. Their medical providers may have individualized recommendations.  Other population cancer screening options, such as those recommended by the American Cancer Society and the National Comprehensive Cancer Network (NCCN), are also appropriate for Deann and her family. These screening recommendations may change if there are changes to Deann's personal and/or family history of cancer. Final screening recommendations should be made in consultation with each individual's primary care provider.      Inheritance:  We reviewed the autosomal dominant inheritance of mutations in these 87 genes. We discussed that Deann did not pass on an identifiable mutation in these genes to her children based on this test result. Mutations in these genes do not  skip generations.      Additional Testing Considerations:  Although Deann's genetic testing result was negative, other relatives may still carry a gene mutation associated with hereditary cancer. This means it would be reasonable for Sparkle's other relatives with cancer (or their closet relatives, like children) to consider meeting with a genetic counselor to discuss their genetic testing options. If any of these relatives do pursue genetic testing, Deann is encouraged to contact me so that we may review the impact of their test results on her.    Summary:  We do not have an explanation for Deann's personal or family history of cancer. While no genetic changes were identified, Deann may still be at risk for certain cancers due to family history, environmental factors, or other genetic causes not identified by this test. Because of that, it is important that she continue with cancer screening based on her personal and family history as discussed above.    Genetic testing is rapidly advancing, and new cancer susceptibility genes will most likely be identified in the future. Therefore, I encouraged Deann to contact me regularly or if there are changes in her personal or family history. This may change how we assess her cancer risk, screening, and the testing we would offer.    Plan:  1. Deann was provided a copy of her test results via [employer].  2. She plans to follow-up with her medical providers, as needed.  3. Sparkle and her relatives should contact me periodically, or if their personal or family history of cancer changes.    Faye Ewing MS, [facility]  Licensed, Certified Genetic Counselor  Office: [phone]  [email or web address]

## 2024-09-27 NOTE — PATIENT INSTRUCTIONS
Negative Genetic Test Result    Genetic Testing  Genetic testing involved a blood or saliva test which looked at the genetic information in select genes for variants associated with cancer risk.  This testing may have included analysis of a single gene due to a known variant in the family, multiple genes most associated with the cancers in a family, or an expanded panel of genes related to many types of cancers.     Results  There are several possible genetic test results, including:   Positive--a harmful mutation (also known as a  pathogenic  or  likely pathogenic  variant) was identified in a gene associated with increased cancer risk.  These risks, as well as medical management options, depend on the specific genetic variant identified.    Negative--no variants were identified in the genes analyzed   Variant of unknown significance--a variant was identified in one or more genes, though it is currently unclear how this impacts cancer risk in the family.  Genetic testing labs are working to collect evidence about these uncertain variants and may provide updates in the future.    What Does a Negative Genetic Test Result Mean?  A negative genetic test results means that no genetic changes (variants) were detected in the genes tested. While no inherited risk factors for cancer were identified, you and your family may be at risk for certain cancers due to family history, environmental factors, or other genetic causes not identified by this test.  It is also possible that your family may still be at risk of carrying a genetic risk factor which you did not inherit. Your genetic counselor can help interpret the result for you and your relatives.      It is important to note which genes were included in your test. A list of these genes can be found on your test result.    Screening Recommendations  A combination of personal and family history factors may inform cancer risk and medical management recommendations.   Population cancer screening options, such as those recommended by the American Cancer Society and the National Comprehensive Cancer Network (NCCN) are appropriate for many families at average risk for cancer.  However, earlier and/or more frequent screening may be recommended based on personal factors (lifestyle, exposures, medications, screening results), family history of cancer, and sometimes genetic factors.  These cancer risk management options should be discussed in more detail with an individual's medical providers.      Please call us if you have any questions or concerns.   Cancer Risk Management Program (Appointments: [phone])  Joel Hughes, MS [facility]  [phone]  Antoinette Weeks, MS, [place] [facility] [phone]  Lily Manuel, MS, [place] [facility]  [phone]  Maxine Leiva, MS, [place] [facility]  [phone]  Jadyn Segovia, MS, [place] [facility]  [phone]  Faye Ewing, MS, [place] [facility] [phone]  Kaylee Moore, MS, [place] [facility] [phone]

## 2024-10-06 ENCOUNTER — HEALTH MAINTENANCE LETTER (OUTPATIENT)
Age: 64
End: 2024-10-06

## 2024-12-10 ENCOUNTER — LAB (OUTPATIENT)
Dept: LAB | Facility: HOSPITAL | Age: 64
End: 2024-12-10
Attending: STUDENT IN AN ORGANIZED HEALTH CARE EDUCATION/TRAINING PROGRAM
Payer: COMMERCIAL

## 2024-12-10 ENCOUNTER — HOSPITAL ENCOUNTER (OUTPATIENT)
Dept: CT IMAGING | Facility: HOSPITAL | Age: 64
Discharge: HOME | End: 2024-12-10
Attending: STUDENT IN AN ORGANIZED HEALTH CARE EDUCATION/TRAINING PROGRAM
Payer: COMMERCIAL

## 2024-12-10 DIAGNOSIS — C23 MALIGNANT NEOPLASM OF GALLBLADDER (H): ICD-10-CM

## 2024-12-10 DIAGNOSIS — Z79.899 OTHER LONG TERM (CURRENT) DRUG THERAPY: ICD-10-CM

## 2024-12-10 LAB
ALBUMIN SERPL BCG-MCNC: 4 G/DL (ref 3.5–5.2)
ALP SERPL-CCNC: 232 U/L (ref 40–150)
ALT SERPL W P-5'-P-CCNC: 26 U/L (ref 0–50)
ANION GAP SERPL CALCULATED.3IONS-SCNC: 15 MMOL/L (ref 7–15)
AST SERPL W P-5'-P-CCNC: 31 U/L (ref 0–45)
BASOPHILS # BLD AUTO: 0 10E3/UL (ref 0–0.2)
BASOPHILS NFR BLD AUTO: 0 %
BILIRUB SERPL-MCNC: 0.2 MG/DL
BUN SERPL-MCNC: 10.9 MG/DL (ref 8–23)
CALCIUM SERPL-MCNC: 8.6 MG/DL (ref 8.8–10.4)
CHLORIDE SERPL-SCNC: 105 MMOL/L (ref 98–107)
CREAT SERPL-MCNC: 0.65 MG/DL (ref 0.51–0.95)
EGFRCR SERPLBLD CKD-EPI 2021: >90 ML/MIN/1.73M2
EOSINOPHIL # BLD AUTO: 0.4 10E3/UL (ref 0–0.7)
EOSINOPHIL NFR BLD AUTO: 6 %
ERYTHROCYTE [DISTWIDTH] IN BLOOD BY AUTOMATED COUNT: 12.7 % (ref 10–15)
GLUCOSE SERPL-MCNC: 93 MG/DL (ref 70–99)
HCO3 SERPL-SCNC: 23 MMOL/L (ref 22–29)
HCT VFR BLD AUTO: 46.2 % (ref 35–47)
HGB BLD-MCNC: 15.1 G/DL (ref 11.7–15.7)
IMM GRANULOCYTES # BLD: 0.1 10E3/UL
IMM GRANULOCYTES NFR BLD: 1 %
LYMPHOCYTES # BLD AUTO: 2.9 10E3/UL (ref 0.8–5.3)
LYMPHOCYTES NFR BLD AUTO: 42 %
MCH RBC QN AUTO: 29.3 PG (ref 26.5–33)
MCHC RBC AUTO-ENTMCNC: 32.7 G/DL (ref 31.5–36.5)
MCV RBC AUTO: 90 FL (ref 78–100)
MONOCYTES # BLD AUTO: 0.7 10E3/UL (ref 0–1.3)
MONOCYTES NFR BLD AUTO: 11 %
NEUTROPHILS # BLD AUTO: 2.7 10E3/UL (ref 1.6–8.3)
NEUTROPHILS NFR BLD AUTO: 40 %
NRBC # BLD AUTO: 0 10E3/UL
NRBC BLD AUTO-RTO: 0 /100
PLAT MORPH BLD: NORMAL
PLATELET # BLD AUTO: 278 10E3/UL (ref 150–450)
POTASSIUM SERPL-SCNC: 3.4 MMOL/L (ref 3.4–5.3)
PROT SERPL-MCNC: 7.1 G/DL (ref 6.4–8.3)
RBC # BLD AUTO: 5.16 10E6/UL (ref 3.8–5.2)
RBC MORPH BLD: NORMAL
SODIUM SERPL-SCNC: 143 MMOL/L (ref 135–145)
WBC # BLD AUTO: 6.8 10E3/UL (ref 4–11)

## 2024-12-10 PROCEDURE — 36415 COLL VENOUS BLD VENIPUNCTURE: CPT

## 2024-12-10 PROCEDURE — 85025 COMPLETE CBC W/AUTO DIFF WBC: CPT

## 2024-12-10 PROCEDURE — 250N000011 HC RX IP 250 OP 636: Performed by: STUDENT IN AN ORGANIZED HEALTH CARE EDUCATION/TRAINING PROGRAM

## 2024-12-10 PROCEDURE — 82947 ASSAY GLUCOSE BLOOD QUANT: CPT

## 2024-12-10 PROCEDURE — 74177 CT ABD & PELVIS W/CONTRAST: CPT

## 2024-12-10 RX ORDER — IOPAMIDOL 755 MG/ML
67 INJECTION, SOLUTION INTRAVASCULAR ONCE
Status: COMPLETED | OUTPATIENT
Start: 2024-12-10 | End: 2024-12-10

## 2024-12-10 RX ADMIN — IOPAMIDOL 67 ML: 755 INJECTION, SOLUTION INTRAVENOUS at 11:13

## 2024-12-16 ENCOUNTER — VIRTUAL VISIT (OUTPATIENT)
Dept: ONCOLOGY | Facility: CLINIC | Age: 64
End: 2024-12-16
Attending: STUDENT IN AN ORGANIZED HEALTH CARE EDUCATION/TRAINING PROGRAM
Payer: COMMERCIAL

## 2024-12-16 DIAGNOSIS — C23 MALIGNANT NEOPLASM OF GALLBLADDER (H): Primary | ICD-10-CM

## 2024-12-16 PROCEDURE — 99214 OFFICE O/P EST MOD 30 MIN: CPT | Performed by: STUDENT IN AN ORGANIZED HEALTH CARE EDUCATION/TRAINING PROGRAM

## 2024-12-16 NOTE — LETTER
12/16/2024      Deann Durand  [address]  [facility] [address]      Dear Colleague,    Thank you for referring your patient, Deann Durand, to the [facility]. Please see a copy of my visit note below.    [facility]  Medical Oncology Patient Visit Note    Patient name: Deann Durand  YOB: 1960    Oncologic History:  Diagnosis: Localized, resected (R0) gallbladder cancer (adenocarcinoma), negative (R0) margins, involvement of perimuscular adipose tissue on peritoneal side, poorly differentiated with lymphovascular invasion (LVI) present, 4/4 portal LN involved  Stage of cancer: [id number]     --> relapse in 7/2024 with anika met    Caris: Her2 IHC 0 (neg), JEAN CARLOS, TMB 2, no actionable alterations, NFE2L2 and TET2 mutations  Germline testing: negative    Prior treatment(s):   - 1) laparoscopic cholecystectomy on 2/2/24  - 2) Laparoscopic re-exploration with resection of liver (segment 4b/5) and portal lymphadenectomy on 3/7/24  - 3) adjuvant capecitabine 4/2024- 7/2024    Current treatment(s):  Adjuvant systemic therapy with oral capecitabine x 6 months (planned)    Treatment intent:  Curative     Care Team  Medical oncologist: Albert Mayers MD ([facility])  Surgical oncologist:  Tacho Harris MD ([facility]) and Silviano Hamilton MD ([facility])  Other members of care team: Dr. Munoz (General Surgery at [facility]) , Domi Darden (PCP) at [facility]   Primary caregiver at home/ contact:  Libra Beard (daughter) at [phone]    Reason for consultation/ patient visit:  Consideration of adjuvant therapy for resected gallbladder cancer     History of presenting illness:  Ms. Deann Durand is a 63 year old female with recent diagnosis of gallbladder cancer (now resected x 2) who presents to establish medical oncology care. Her oncologic history (as obtained from chart review, patient interview) is summarized below  -    12/23 and 1/24 - presented with chronic,  vague abdominal pain and dyspepsia. Diagnosed with symptomatic cholelithiasis.   -2/2/24 : laparoscopic cholecystectomy with Dr. Munoz ([facility]) at [facility]. Pathology showed poorly differentiated adenocarcinoma of gallbladder, 2.4cm in size, negative margins in cystic duct and liver side. pT2a with perimuscular and lymphovascular invasion noted.   -2/13/24: CT CAP - no evidence of metastatic disease.   -2/21/24 : Saw surgeon Dr. Tacho Harris at [facility], recommended repeat surgery with partial hepatic resection and portal lymphadenectomy.   -2/26/24 : Saw surgeon Dr. Silviano Hamilton at [facility], concurred with above.   -3/07/24 : underwent laparoscopic liver resection (of segment 4b/5) and portal lympadenectomy  -3/25/24 : visit with Med oncology to discuss adjuvant therapy   - 4/2024- 7/2024- adjuvant cape  - 7/24/2024- CT CAP with gastrohep LN, biopsy with cancer    8/2024:- we decided to not pursue cancer-directed therapy  12/2024 CT CAP with growth in LN and bone lesions    Interval history:  Phone visit  By herself  Daughter getting teeth pulled  Physically doing ok  On PPI and SSRI  Retired      Past medical history:  GERD  Bilateral carpal tunnel syndrome    Past surgical history:  Laparoscopic Cholecystectomy (2/2/24)  Laparoscopic Liver resection (seg 4b/5) and portal lymphadenectomy (3/7/24)  Bladder suspension   cervical disc arthroplasty   hysterectomy   wisdom teeth extraction    Social history:   Currently lives independently in [place], [place]. Works at [employer] in [place], worked there for last 5 years. Jeet Smyth lives in [place], [place] (30min away from her) and involved in her care, also has health issues of her own. She also has two neices in [place] who check in on her. Loves to garden. 2 daughters, 4 grandkids,  Former smoker, 45 pack year history, currently smoking 1pack per day.       Family history:  EtOh use disorder in her birth father, birth mother, and brother;    Cancer -  father -throat cacner, uncle had colon cancer, maternal grandmother - throat cancer, and sister - lung cancer, another sister - stomach and colon cancer;   Dementia in her sister.     Physical examination:  Phone visit      Lab and imaging data:  I personally reviewed the CT CAP from 12/2024  that demonstrated progression of cancer with bony mets        Assessment and Plan:    Marie 65 yo woman from [place], [place].  Aggressive GB cancer s.p resection in early 2024.  Relapse in nodes while on adjuvant cape.  No actionable mutations.    Strong family history of cancer-- germline testing negative.    Not pursuing cancer-directed therapy.  Had referred to [facility], did not set up.    She would like to not pursue chemo, or scans etc unless curative.  We will not have scheduled med onc follow up.  She will reach out ''as her symptoms worsen''. No needs right now.      I spent a total of 35 minutes on the date of service including preparation time (e.g., review of records and interpretation of tests), visit time with the patient and care partners, requesting interventions, communicating with other health care professionals, and documentation.        Albert Mayers M.D.   of Medicine  Division of Hematology, Oncology and Transplantation  [facility]         Again, thank you for allowing me to participate in the care of your patient.        Sincerely,        Albert Mayers MD

## 2024-12-16 NOTE — PROGRESS NOTES
[facility]  Medical Oncology Patient Visit Note    Patient name: Deann Durand  YOB: 1960    Oncologic History:  Diagnosis: Localized, resected (R0) gallbladder cancer (adenocarcinoma), negative (R0) margins, involvement of perimuscular adipose tissue on peritoneal side, poorly differentiated with lymphovascular invasion (LVI) present, 4/4 portal LN involved  Stage of cancer: [id number]     --> relapse in 7/2024 with anika met    Caris: Her2 IHC 0 (neg), JEAN CARLOS, TMB 2, no actionable alterations, NFE2L2 and TET2 mutations  Germline testing: negative    Prior treatment(s):   - 1) laparoscopic cholecystectomy on 2/2/24  - 2) Laparoscopic re-exploration with resection of liver (segment 4b/5) and portal lymphadenectomy on 3/7/24  - 3) adjuvant capecitabine 4/2024- 7/2024    Current treatment(s):  Adjuvant systemic therapy with oral capecitabine x 6 months (planned)    Treatment intent:  Curative     Care Team  Medical oncologist: Albert Mayers MD ([facility])  Surgical oncologist:  Tacho Hraris MD ([facility]) and Silviano Hamilton MD ([facility])  Other members of care team: Dr. Munoz (General Surgery at [facility]) , Domi Darden (PCP) at [facility]   Primary caregiver at home/ contact:  Libra Beard (daughter) at [phone]    Reason for consultation/ patient visit:  Consideration of adjuvant therapy for resected gallbladder cancer     History of presenting illness:  Ms. Deann Durand is a 63 year old female with recent diagnosis of gallbladder cancer (now resected x 2) who presents to establish medical oncology care. Her oncologic history (as obtained from chart review, patient interview) is summarized below  -    12/23 and 1/24 - presented with chronic, vague abdominal pain and dyspepsia. Diagnosed with symptomatic cholelithiasis.   -2/2/24 : laparoscopic cholecystectomy with Dr. Munoz ([facility]) at [facility]. Pathology showed poorly differentiated adenocarcinoma of  gallbladder, 2.4cm in size, negative margins in cystic duct and liver side. pT2a with perimuscular and lymphovascular invasion noted.   -2/13/24: CT CAP - no evidence of metastatic disease.   -2/21/24 : [place] Tacho [place] [facility] recommended repeat surgery with partial hepatic resection and portal lymphadenectomy.   -2/26/24 : Saw surgeon Dr. Silviano Hamilton at [facility], concurred with above.   -3/07/24 : underwent laparoscopic liver resection (of segment 4b/5) and portal lympadenectomy  -3/25/24 : visit with Med oncology to discuss adjuvant therapy   - 4/2024- 7/2024- adjuvant cape  - 7/24/2024- CT CAP with gastrohep LN, biopsy with cancer    8/2024:- we decided to not pursue cancer-directed therapy  12/2024 CT CAP with growth in LN and bone lesions    Interval history:  Phone visit  By herself  Daughter getting teeth pulled  Physically doing ok  On PPI and SSRI  Retired      Past medical history:  GERD  Bilateral carpal tunnel syndrome    Past surgical history:  Laparoscopic Cholecystectomy (2/2/24)  Laparoscopic Liver resection (seg 4b/5) and portal lymphadenectomy (3/7/24)  Bladder suspension   cervical disc arthroplasty   hysterectomy   wisdom teeth extraction    Social history:   Currently lives independently in [place], [place]. Works at [email or web address] in [place], worked there for last 5 years. Jeet Smyth lives in [place], [place] (30min away from her) and involved in her care, also has health issues of her own. She also has two neices in [place] who check in on her. Loves to garden. 2 daughters, 4 grandkids,  Former smoker, 45 pack year history, currently smoking 1pack per day.       Family history:  EtOh use disorder in her birth father, birth mother, and brother;   Cancer -  father -throat cacner, uncle had colon cancer, maternal grandmother - throat cancer, and sister - lung cancer, another sister - stomach and colon cancer;   Dementia in her sister.     Physical examination:  Phone visit      Lab  and imaging data:  I personally reviewed the CT CAP from 12/2024  that demonstrated progression of cancer with bony mets        Assessment and Plan:    Marie 65 yo woman from [place], [place].  Aggressive GB cancer s.p resection in early 2024.  Relapse in nodes while on adjuvant cape.  No actionable mutations.    Strong family history of cancer-- germline testing negative.    Not pursuing cancer-directed therapy.  Had referred to [facility], did not set up.    She would like to not pursue chemo, or scans etc unless curative.  We will not have scheduled med onc follow up.  She will reach out ''as her symptoms worsen''. No needs right now.      I spent a total of 35 minutes on the date of service including preparation time (e.g., review of records and interpretation of tests), visit time with the patient and care partners, requesting interventions, communicating with other health care professionals, and documentation.        Albert Mayers M.D.   of Medicine  Division of Hematology, Oncology and Transplantation  [facility]

## 2025-03-17 ENCOUNTER — PATIENT OUTREACH (OUTPATIENT)
Dept: ONCOLOGY | Facility: CLINIC | Age: 65
End: 2025-03-17
Payer: COMMERCIAL

## 2025-03-17 NOTE — PROGRESS NOTES
[facility]: Cancer Care Short Note                                                                                          Incoming Call:   Phone call from Sparkle's daughter Libra.  She's worried about her mother's cancer progression, pain and dying while in the hospital.  Discussed her mother's wishes to be at peace and not suffer.  Reviewed the goals of curative treatment vs palliative treatment vs hospice.  She will discuss options and goals with the team at [facility].  Answered questions to Libra's satisfaction. Offered TLC and active listening      Lucille Ravi RN, BSN, OCN  RN Care Coordinator   [facility]

## (undated) RX ORDER — FENTANYL CITRATE 50 UG/ML
INJECTION, SOLUTION INTRAMUSCULAR; INTRAVENOUS
Status: DISPENSED
Start: 2024-08-06